# Patient Record
Sex: FEMALE | Race: WHITE | NOT HISPANIC OR LATINO | Employment: OTHER | URBAN - METROPOLITAN AREA
[De-identification: names, ages, dates, MRNs, and addresses within clinical notes are randomized per-mention and may not be internally consistent; named-entity substitution may affect disease eponyms.]

---

## 2020-06-22 ENCOUNTER — HISTORICAL (OUTPATIENT)
Dept: CARDIOLOGY | Facility: HOSPITAL | Age: 65
End: 2020-06-22

## 2020-06-22 LAB
ABS NEUT (OLG): 3.59 X10(3)/MCL (ref 2.1–9.2)
BASOPHILS # BLD AUTO: 0.1 X10(3)/MCL (ref 0–0.2)
BASOPHILS NFR BLD AUTO: 1 %
EOSINOPHIL # BLD AUTO: 0.2 X10(3)/MCL (ref 0–0.9)
EOSINOPHIL NFR BLD AUTO: 3 %
ERYTHROCYTE [DISTWIDTH] IN BLOOD BY AUTOMATED COUNT: 15.7 % (ref 11.5–17)
HCT VFR BLD AUTO: 28.6 % (ref 37–47)
HGB BLD-MCNC: 8.6 GM/DL (ref 12–16)
LYMPHOCYTES # BLD AUTO: 2.8 X10(3)/MCL (ref 0.6–4.6)
LYMPHOCYTES NFR BLD AUTO: 37 %
MCH RBC QN AUTO: 26.9 PG (ref 27–31)
MCHC RBC AUTO-ENTMCNC: 30.1 GM/DL (ref 33–36)
MCV RBC AUTO: 89.4 FL (ref 80–94)
MONOCYTES # BLD AUTO: 0.8 X10(3)/MCL (ref 0.1–1.3)
MONOCYTES NFR BLD AUTO: 11 %
NEUTROPHILS # BLD AUTO: 3.59 X10(3)/MCL (ref 2.1–9.2)
NEUTROPHILS NFR BLD AUTO: 48 %
PLATELET # BLD AUTO: 242 X10(3)/MCL (ref 130–400)
PMV BLD AUTO: 11.4 FL (ref 9.4–12.4)
RBC # BLD AUTO: 3.2 X10(6)/MCL (ref 4.2–5.4)
WBC # SPEC AUTO: 7.5 X10(3)/MCL (ref 4.5–11.5)

## 2021-11-11 ENCOUNTER — HISTORICAL (OUTPATIENT)
Dept: CARDIOLOGY | Facility: HOSPITAL | Age: 66
End: 2021-11-11

## 2021-11-11 LAB
ABS NEUT (OLG): 3.9 X10(3)/MCL (ref 2.1–9.2)
BASOPHILS # BLD AUTO: 0 X10(3)/MCL (ref 0–0.2)
BASOPHILS NFR BLD AUTO: 0 %
BUN SERPL-MCNC: 20 MG/DL (ref 9.8–20.1)
CALCIUM SERPL-MCNC: 9.1 MG/DL (ref 8.7–10.5)
CHLORIDE SERPL-SCNC: 98 MMOL/L (ref 98–107)
CO2 SERPL-SCNC: 23 MMOL/L (ref 23–31)
CREAT SERPL-MCNC: 1.08 MG/DL (ref 0.55–1.02)
CREAT/UREA NIT SERPL: 19
ERYTHROCYTE [DISTWIDTH] IN BLOOD BY AUTOMATED COUNT: 16.3 % (ref 11.5–17)
GLUCOSE SERPL-MCNC: 558 MG/DL (ref 82–115)
HCT VFR BLD AUTO: 29.1 % (ref 37–47)
HGB BLD-MCNC: 8.8 GM/DL (ref 12–16)
LYMPHOCYTES # BLD AUTO: 0.5 X10(3)/MCL (ref 0.6–4.6)
LYMPHOCYTES NFR BLD AUTO: 12 %
MCH RBC QN AUTO: 21.9 PG (ref 27–31)
MCHC RBC AUTO-ENTMCNC: 30.2 GM/DL (ref 33–36)
MCV RBC AUTO: 72.6 FL (ref 80–94)
MONOCYTES # BLD AUTO: 0.1 X10(3)/MCL (ref 0.1–1.3)
MONOCYTES NFR BLD AUTO: 2 %
NEUTROPHILS # BLD AUTO: 3.9 X10(3)/MCL (ref 2.1–9.2)
NEUTROPHILS NFR BLD AUTO: 85 %
PLATELET # BLD AUTO: 244 X10(3)/MCL (ref 130–400)
PMV BLD AUTO: 11.2 FL (ref 9.4–12.4)
POTASSIUM SERPL-SCNC: 4.5 MMOL/L (ref 3.5–5.1)
RBC # BLD AUTO: 4.01 X10(6)/MCL (ref 4.2–5.4)
SODIUM SERPL-SCNC: 135 MMOL/L (ref 136–145)
WBC # SPEC AUTO: 4.6 X10(3)/MCL (ref 4.5–11.5)

## 2022-04-10 ENCOUNTER — HISTORICAL (OUTPATIENT)
Dept: ADMINISTRATIVE | Facility: HOSPITAL | Age: 67
End: 2022-04-10
Payer: MEDICARE

## 2022-04-28 VITALS
WEIGHT: 163.81 LBS | HEIGHT: 61 IN | DIASTOLIC BLOOD PRESSURE: 64 MMHG | SYSTOLIC BLOOD PRESSURE: 142 MMHG | BODY MASS INDEX: 30.93 KG/M2 | OXYGEN SATURATION: 98 %

## 2022-06-30 DIAGNOSIS — M54.50 LOWER BACK PAIN: Primary | ICD-10-CM

## 2022-07-05 ENCOUNTER — DOCUMENTATION ONLY (OUTPATIENT)
Dept: HEMATOLOGY/ONCOLOGY | Facility: CLINIC | Age: 67
End: 2022-07-05
Payer: MEDICARE

## 2022-07-05 NOTE — PROGRESS NOTES
Patient cancelled appt for 7/6/22 due to a fall she had on the Friday before which injured her shoulder. She is unable to drive at this time and states that she will call back when she is better.

## 2022-07-18 ENCOUNTER — TELEPHONE (OUTPATIENT)
Dept: HEMATOLOGY/ONCOLOGY | Facility: CLINIC | Age: 67
End: 2022-07-18

## 2022-07-18 NOTE — TELEPHONE ENCOUNTER
Patient called to let you know she does not have the energy to get up out of bed. She has been getting up only to use bathroom and go back to bed. She slept all weekend that according to her is very unusual. She has had to get blood transfusions in past. She is due for appointment now but don't see anything scheduled. Labs scheduled for 6/27/2022 but not done yet.

## 2022-07-22 ENCOUNTER — OFFICE VISIT (OUTPATIENT)
Dept: HEMATOLOGY/ONCOLOGY | Facility: CLINIC | Age: 67
End: 2022-07-22
Payer: COMMERCIAL

## 2022-07-22 VITALS
BODY MASS INDEX: 28.51 KG/M2 | HEIGHT: 61 IN | DIASTOLIC BLOOD PRESSURE: 71 MMHG | RESPIRATION RATE: 18 BRPM | HEART RATE: 60 BPM | WEIGHT: 151 LBS | OXYGEN SATURATION: 98 % | SYSTOLIC BLOOD PRESSURE: 110 MMHG

## 2022-07-22 DIAGNOSIS — D50.0 IRON DEFICIENCY ANEMIA DUE TO CHRONIC BLOOD LOSS: ICD-10-CM

## 2022-07-22 PROBLEM — F32.A DEPRESSION: Status: ACTIVE | Noted: 2022-07-22

## 2022-07-22 PROBLEM — M79.7 FIBROMYALGIA: Status: ACTIVE | Noted: 2022-07-22

## 2022-07-22 PROCEDURE — 3074F PR MOST RECENT SYSTOLIC BLOOD PRESSURE < 130 MM HG: ICD-10-PCS | Mod: CPTII,,, | Performed by: STUDENT IN AN ORGANIZED HEALTH CARE EDUCATION/TRAINING PROGRAM

## 2022-07-22 PROCEDURE — 3288F PR FALLS RISK ASSESSMENT DOCUMENTED: ICD-10-PCS | Mod: CPTII,,, | Performed by: STUDENT IN AN ORGANIZED HEALTH CARE EDUCATION/TRAINING PROGRAM

## 2022-07-22 PROCEDURE — 1101F PT FALLS ASSESS-DOCD LE1/YR: CPT | Mod: CPTII,,, | Performed by: STUDENT IN AN ORGANIZED HEALTH CARE EDUCATION/TRAINING PROGRAM

## 2022-07-22 PROCEDURE — 3008F BODY MASS INDEX DOCD: CPT | Mod: CPTII,,, | Performed by: STUDENT IN AN ORGANIZED HEALTH CARE EDUCATION/TRAINING PROGRAM

## 2022-07-22 PROCEDURE — 3078F DIAST BP <80 MM HG: CPT | Mod: CPTII,,, | Performed by: STUDENT IN AN ORGANIZED HEALTH CARE EDUCATION/TRAINING PROGRAM

## 2022-07-22 PROCEDURE — 1101F PR PT FALLS ASSESS DOC 0-1 FALLS W/OUT INJ PAST YR: ICD-10-PCS | Mod: CPTII,,, | Performed by: STUDENT IN AN ORGANIZED HEALTH CARE EDUCATION/TRAINING PROGRAM

## 2022-07-22 PROCEDURE — 1159F PR MEDICATION LIST DOCUMENTED IN MEDICAL RECORD: ICD-10-PCS | Mod: CPTII,,, | Performed by: STUDENT IN AN ORGANIZED HEALTH CARE EDUCATION/TRAINING PROGRAM

## 2022-07-22 PROCEDURE — 3074F SYST BP LT 130 MM HG: CPT | Mod: CPTII,,, | Performed by: STUDENT IN AN ORGANIZED HEALTH CARE EDUCATION/TRAINING PROGRAM

## 2022-07-22 PROCEDURE — 99215 PR OFFICE/OUTPT VISIT, EST, LEVL V, 40-54 MIN: ICD-10-PCS | Mod: ,,, | Performed by: STUDENT IN AN ORGANIZED HEALTH CARE EDUCATION/TRAINING PROGRAM

## 2022-07-22 PROCEDURE — 3288F FALL RISK ASSESSMENT DOCD: CPT | Mod: CPTII,,, | Performed by: STUDENT IN AN ORGANIZED HEALTH CARE EDUCATION/TRAINING PROGRAM

## 2022-07-22 PROCEDURE — 1159F MED LIST DOCD IN RCRD: CPT | Mod: CPTII,,, | Performed by: STUDENT IN AN ORGANIZED HEALTH CARE EDUCATION/TRAINING PROGRAM

## 2022-07-22 PROCEDURE — 3008F PR BODY MASS INDEX (BMI) DOCUMENTED: ICD-10-PCS | Mod: CPTII,,, | Performed by: STUDENT IN AN ORGANIZED HEALTH CARE EDUCATION/TRAINING PROGRAM

## 2022-07-22 PROCEDURE — 3078F PR MOST RECENT DIASTOLIC BLOOD PRESSURE < 80 MM HG: ICD-10-PCS | Mod: CPTII,,, | Performed by: STUDENT IN AN ORGANIZED HEALTH CARE EDUCATION/TRAINING PROGRAM

## 2022-07-22 PROCEDURE — 99215 OFFICE O/P EST HI 40 MIN: CPT | Mod: ,,, | Performed by: STUDENT IN AN ORGANIZED HEALTH CARE EDUCATION/TRAINING PROGRAM

## 2022-07-22 RX ORDER — METFORMIN HYDROCHLORIDE 1000 MG/1
1000 TABLET ORAL 2 TIMES DAILY
COMMUNITY

## 2022-07-22 RX ORDER — ROPINIROLE 3 MG/1
3 TABLET, FILM COATED ORAL NIGHTLY
COMMUNITY
Start: 2021-11-11

## 2022-07-22 RX ORDER — TRAMADOL HYDROCHLORIDE 200 MG/1
200 TABLET, EXTENDED RELEASE ORAL DAILY
COMMUNITY

## 2022-07-22 RX ORDER — VENLAFAXINE 75 MG/1
75 TABLET ORAL 2 TIMES DAILY
COMMUNITY

## 2022-07-22 RX ORDER — PREGABALIN 100 MG/1
100 CAPSULE ORAL 3 TIMES DAILY PRN
COMMUNITY

## 2022-07-22 NOTE — PROGRESS NOTES
Chief Complaint  6 wk f/u, Labs, Anemia  History of Present Illness  Referring provider: Dr. Bond  Reason for consult: Anemia      Patient is a 66-year-old with a diagnosis of fibromyalgia, type 2 diabetes, osteoarthritis, and peripheral vascular disease who presented to clinic to establish care for anemia.  Patient reports being diagnosed with anemia about 8 months back while following with a cardiologist for vascular insufficiency.  She was found to have a hemoglobin level of 6 and required 2 units of blood transfusion back pain.  Patient denies receiving any further blood transfusions.  She denies any symptoms of GI blood loss recently.  Patient has had a colonoscopy and EGD in September/November 2021 which was reportedly within normal limits.  Patient reports a prior history of hysterectomy as well as hemorrhoid surgery.  She denies any history of gastric surgeries.  She reports a well-balanced diet.  Patient denies any symptoms of chest pain, palpitation or shortness of breath.  She does report persistent fatigue which is unchanged in nature.     Today, 07/22/2022, patient reports symptoms of profound fatigue leading to decline in her functional status.  She denies chest pain, shortness of breath, palpitations.  She denies any clinical symptoms of bleeding.  Patient reports an episode of passing out when she was at laboratory to do her blood work prior to this visit.  She was seen in the ER his CT head done which was within normal limits and was discharged with plans for outpatient follow-up.      Past medical history: Hypertension, hyperlipidemia, type 2 diabetes, fibromyalgia, osteoarthritis, peripheral vascular disease  Past surgical history: Hysterectomy, hemorrhoid surgery  Social history: Current smoker, half pack per day, 20-pack-year smoking history, denies alcohol use or recreational drug use.  Used to work as a manager at [employer], currently retired, lives with her .  Family history: Patient  was adopted      Laboratory  10/19/2021: WBC 5.9, hemoglobin 9.3, MCV 74.7, platelet count 354, sodium 134, potassium 4.4, calcium 8.6, creatinine 0.9.  2/23/2022: WBC 5.90, hemoglobin 10.6, MCV 71.3, platelet count 189.  Iron 13, TIBC 638, percent saturation 2, ferritin 3, folic acid 10.48, vitamin B12 348  4/6/2022: WBC count 6.46, hemoglobin 11.7, MCV 82.8, platelet count 237, iron 71, TIBC 395, percent saturation 18, ferritin 295  07/19/2022:  WBC 7.7, hemoglobin 13.3, MCV 95.4, platelet count 297, iron 68, TIBC 424,% saturation 16, ferritin 8 2, folic acid 9.7, vitamin B12 347.        Treatment history  3/7/22-3/14/22: Injectafer x2    Review of Systems  CONSTITUTIONAL: no fevers, no chills, no weight loss, + fatigue, no weakness  HEMATOLOGIC: no abnromal bleeding, no abnormal bruising, no drenching  night sweats  ONCOLOGIC: no new masses or lumps  HEENT: no vision loss, no tinnitus or hearing loss, no nose bleeding, no dysphagia, no odynophagia  CVS: no chest pain, no palpitations, no dyspnea on exertion  RESP: no shortness of breath, no hemoptysis, no cough  BREAST: no nipple discharge, no breast tenderness, no breast masses on self breast examination  GI: no nausea, no vomiting, no diarrhea, no constipation, no melena, no hematochezia, no hematemesis, no abdominal pain, no increase in abdominal girth  : no dysuria, no hematuria, no discharge  GYN: no abnormal vaginal bleeding, no dyspareunia, no vaginal discharge  INTEGUMENT: no rashes, no abnormal bruising, no nail pitting, no hyperpigmentation  NEURO: no falls, no memory loss, no paresthesias or dysesthesias, no urofecal incontinence or retention, no loss of strength on any extremity  MSK: no back pain, no new joint pain, no joint swelling  PSYCH: no suicidal or homicidal ideation, no depression, no insomnia, no anhedonia  ENDOCRINE: no heat or cold intolerance, no polyuria, no polydipsia           Physical Exam    Vitals:    07/22/22 1020   BP: 110/71    Pulse: 60   Resp: 18       GA: AAOx3, NAD, appears tired  HEENT: Mild pallor,, PERRLA, EOMI, good dentition, no oral ulcers  LYMPH: no cervical, axillary or supraclavicular adenopathy  CVS: s1s2 RRR, no M/R/G  RESP: CTA b/l, no crackles, no wheezes or ronchi  ABD: soft, NT, ND, BS+, no hepatosplenomegaly  EXT: no deformities, no pedal edema  SKIN: no rashes, no bruises or purpura, warm and dry  NEURO: normal mentation, strength 5/5 on all 4 extremities, no sensory deficits      Assessment/Plan  1. Iron deficiency anemia D50.9  Patient with a diagnosis of chronic hypochromic microcytic anemia since April 2021  Patient reports receiving 2 units of blood transfusion in April.  Patient has had an EGD and colonoscopy in fall 2021 which was reportedly within normal limits  Patient denies undergoing capsule endoscopy.  Patient is s/p hysterectomy and hemorrhoidectomy.  She denies any symptoms of GI blood loss, nausea, vomiting, epistaxis, hemoptysis.  Patient denies any prior history of gastric surgeries and reports a well-balanced diet.    Noted that are consistent with iron deficiency anemia with extremely low iron and ferritin levels.  Patient s/p Injectafer x2 with improvement in the hemoglobin as well as iron indicis  Discussed the likely cause of iron deficiency as GI blood loss.  Patient has had colonoscopy and EGD last year however without capsule endoscopy.   Noted positive stool occult blood  Patient is scheduled to see Dr. Graff next month.  Will try and get her a earlier appointment for considerations of EGD/colonoscopy/capsule endoscopy in the setting of iron deficiency anemia and GI blood loss  Referred patient to Neurology given a syncopal episode leading to ER visit  Patient was advised to call our office if she were to develop symptoms of chest pain, shortness of breath, palpitations, progressive fatigue or symptoms of GI blood loss.      Plan  # Refer to neurology given recent syncopal episode  # Refer  to gastroenterology  # patient was encouraged to keep her follow-up with psychiatry to rule out uncontrolled clinical depression.  # follow-up in clinic in 3 months with repeat iron panel, CBC, CMP, vitamin B12, folic acid level    A total of  40 minutes were spent in review of records, interpretation of test, coordination of care, discussion and counseling with the patient.

## 2022-09-26 ENCOUNTER — OFFICE VISIT (OUTPATIENT)
Dept: ORTHOPEDIC SURGERY | Facility: CLINIC | Age: 67
End: 2022-09-26
Payer: MEDICARE

## 2022-09-26 ENCOUNTER — HOSPITAL ENCOUNTER (OUTPATIENT)
Dept: RADIOLOGY | Facility: CLINIC | Age: 67
Discharge: HOME | End: 2022-09-26
Attending: ORTHOPAEDIC SURGERY
Payer: COMMERCIAL

## 2022-09-26 VITALS — BODY MASS INDEX: 28.51 KG/M2 | HEIGHT: 61 IN | WEIGHT: 151 LBS

## 2022-09-26 DIAGNOSIS — M51.36 DDD (DEGENERATIVE DISC DISEASE), LUMBAR: ICD-10-CM

## 2022-09-26 DIAGNOSIS — M54.9 DORSALGIA, UNSPECIFIED: ICD-10-CM

## 2022-09-26 DIAGNOSIS — M43.10 SPONDYLOLISTHESIS, ACQUIRED: Primary | ICD-10-CM

## 2022-09-26 PROCEDURE — 3008F BODY MASS INDEX DOCD: CPT | Mod: CPTII,S$GLB,, | Performed by: ORTHOPAEDIC SURGERY

## 2022-09-26 PROCEDURE — 1159F MED LIST DOCD IN RCRD: CPT | Mod: CPTII,S$GLB,, | Performed by: ORTHOPAEDIC SURGERY

## 2022-09-26 PROCEDURE — 99204 PR OFFICE/OUTPT VISIT, NEW, LEVL IV, 45-59 MIN: ICD-10-PCS | Mod: S$GLB,,, | Performed by: ORTHOPAEDIC SURGERY

## 2022-09-26 PROCEDURE — 3008F PR BODY MASS INDEX (BMI) DOCUMENTED: ICD-10-PCS | Mod: CPTII,S$GLB,, | Performed by: ORTHOPAEDIC SURGERY

## 2022-09-26 PROCEDURE — 1159F PR MEDICATION LIST DOCUMENTED IN MEDICAL RECORD: ICD-10-PCS | Mod: CPTII,S$GLB,, | Performed by: ORTHOPAEDIC SURGERY

## 2022-09-26 PROCEDURE — 99204 OFFICE O/P NEW MOD 45 MIN: CPT | Mod: S$GLB,,, | Performed by: ORTHOPAEDIC SURGERY

## 2022-09-26 PROCEDURE — 72110 X-RAY EXAM L-2 SPINE 4/>VWS: CPT | Mod: ,,, | Performed by: ORTHOPAEDIC SURGERY

## 2022-09-26 PROCEDURE — 72110 XR LUMBAR SPINE AP AND LAT WITH FLEX/EXT: ICD-10-PCS | Mod: ,,, | Performed by: ORTHOPAEDIC SURGERY

## 2022-09-26 NOTE — PROGRESS NOTES
DATE: 2022  PATIENT: Radha Day    Attending Physician: Avtar Sandoval M.D.    CHIEF COMPLAINT:  Low back pain    HISTORY:  Radha Day is a 67 y.o. female who presents for evaluation of neck and low back pain.  Her chief complaint is low back pain this began progressively worse over the years.  She does have a history of fibromyalgia for which she takes tramadol and Lyrica.  The patient's chief complaint is low back pain causing difficulty sleeping, this in fact sleeping in recliner over the past year.  Her pain is also worse after a long day.  She has difficulty rising from the seated position.  She has not yet been to physical therapy.      Review of systems is notable 10 lb of unintentional weight loss, as well as night sweats and fatigue.  The patient reports that she is up-to-date on her routine health screenings.    The Patient denies myelopathic symptoms such as handwriting changes or difficulty with buttons/coins/keys. Denies perineal paresthesias, bowel/bladder dysfunction.    PAST MEDICAL/SURGICAL HISTORY:  Past Medical History:   Diagnosis Date    Depression     Diabetes mellitus     Fibromyalgia     Hyperlipidemia     Hypertension      Past Surgical History:   Procedure Laterality Date    ADENOIDECTOMY      BILATERAL TUBAL LIGATION      CARPAL TUNNEL RELEASE      x2     SECTION      DILATION AND CURETTAGE OF UTERUS      x3    HYSTERECTOMY      LIGATION OF HEMORRHOIDS      TONSILLECTOMY         Current Medications:   Current Outpatient Medications:     metFORMIN (GLUCOPHAGE) 1000 MG tablet, Take 1,000 mg by mouth 2 (two) times a day., Disp: , Rfl:     pregabalin (LYRICA) 100 MG capsule, Take 100 mg by mouth 3 (three) times daily as needed., Disp: , Rfl:     rOPINIRole (REQUIP) 3 MG tablet, Take 3 mg by mouth nightly., Disp: , Rfl:     rosuvastatin 20 mg CpSP, Take 20 mg by mouth nightly., Disp: , Rfl:     traMADoL (ULTRAM-ER) 200 MG Tb24, Take 200 mg by mouth once daily at  "6am., Disp: , Rfl:     venlafaxine (EFFEXOR) 75 MG tablet, Take 75 mg by mouth 2 (two) times a day., Disp: , Rfl:     Social History:   Social History     Socioeconomic History    Marital status:    Tobacco Use    Smoking status: Some Days     Types: Vaping with nicotine    Smokeless tobacco: Never    Tobacco comments:     was a cigarette smoker for 42 years   Substance and Sexual Activity    Alcohol use: Not Currently        EXAM:  Ht 5' 1" (1.549 m)   Wt 68.5 kg (151 lb 0.2 oz)   BMI 28.53 kg/m²     PHYSICAL EXAMINATION:    Gait: Normal station and gait, no difficulty with toe or heel walk.   Skin: Dorsal lumbar skin negative for rashes, lesions, hairy patches and surgical scars. There is minimal lumbar tenderness to palpation.  Range of motion: Lumbar range of motion is acceptable.  Spinal Balance: Global saggital and coronal spinal balance acceptable, no significant for scoliosis and kyphosis.  Musculoskeletal: No pain with the range of motion of the bilateral hips. No trochanteric tenderness to palpation.  Vascular: Bilateral lower extremities warm and well perfused, Dorsalis pedis pulses 2+ bilaterally.  Neurological: Normal strength and tone in all major motor groups in the bilateral lower extremities. Normal sensation to light touch in the L2-S1 dermatomes bilaterally.  Deep tendon reflexes symmetric 1+ in the bilateral lower extremities.  Negative Babinski bilaterally. Straight leg raise negative bilaterally.    IMAGING:      Today I personally reviewed AP, Lat and Flex/Ex  upright L-spine radiographs taken in the office today that demonstrate a grade 1 L4-5 anterolisthesis.  Body mass index is 28.53 kg/m².  Hemoglobin A1C   Date Value Ref Range Status   11/29/2020 9.4 (H) <=6.5 % Final       ASSESSMENT/PLAN:    Diagnoses and all orders for this visit:    Spondylolisthesis, acquired    Dorsalgia, unspecified  -     MRI Lumbar Spine Without Contrast; Future      No follow-ups on file.    Given the " patient's constitutional symptoms I recommended a MRI of the lumbar spine to evaluate her low back pain in the context of possible metastatic disease I will see her back for results.

## 2023-06-28 ENCOUNTER — TELEPHONE (OUTPATIENT)
Dept: HEMATOLOGY/ONCOLOGY | Facility: CLINIC | Age: 68
End: 2023-06-28
Payer: MEDICARE

## 2023-06-28 NOTE — TELEPHONE ENCOUNTER
Patient with Dx of iron deficiency called and complains of being very tired, no energy and very cold in late afternoon, sleeping a lot more than normal and worried her iron may be low, states this has been going on for 2 weeks now. Last seen in July 2022. Please advise.    Thank you

## 2023-06-30 RX ORDER — HEPARIN 100 UNIT/ML
5 SYRINGE INTRAVENOUS
Status: CANCELLED | OUTPATIENT
Start: 2023-07-03

## 2023-06-30 RX ORDER — DIPHENHYDRAMINE HYDROCHLORIDE 50 MG/ML
50 INJECTION INTRAMUSCULAR; INTRAVENOUS ONCE AS NEEDED
Status: CANCELLED | OUTPATIENT
Start: 2023-07-03

## 2023-06-30 RX ORDER — SODIUM CHLORIDE 0.9 % (FLUSH) 0.9 %
10 SYRINGE (ML) INJECTION
Status: CANCELLED | OUTPATIENT
Start: 2023-07-03

## 2023-06-30 RX ORDER — SODIUM CHLORIDE 9 MG/ML
INJECTION, SOLUTION INTRAVENOUS CONTINUOUS
Status: CANCELLED | OUTPATIENT
Start: 2023-07-03

## 2023-06-30 RX ORDER — EPINEPHRINE 0.3 MG/.3ML
0.3 INJECTION SUBCUTANEOUS ONCE AS NEEDED
Status: CANCELLED | OUTPATIENT
Start: 2023-07-03

## 2023-07-05 RX ORDER — EPINEPHRINE 0.3 MG/.3ML
0.3 INJECTION SUBCUTANEOUS ONCE AS NEEDED
Status: CANCELLED | OUTPATIENT
Start: 2023-07-05

## 2023-07-05 RX ORDER — HEPARIN 100 UNIT/ML
500 SYRINGE INTRAVENOUS
OUTPATIENT
Start: 2023-07-05

## 2023-07-05 RX ORDER — HEPARIN 100 UNIT/ML
500 SYRINGE INTRAVENOUS
Status: CANCELLED | OUTPATIENT
Start: 2023-07-05

## 2023-07-05 RX ORDER — EPINEPHRINE 0.3 MG/.3ML
0.3 INJECTION SUBCUTANEOUS ONCE AS NEEDED
OUTPATIENT
Start: 2023-07-05

## 2023-07-05 RX ORDER — DIPHENHYDRAMINE HYDROCHLORIDE 50 MG/ML
50 INJECTION INTRAMUSCULAR; INTRAVENOUS ONCE AS NEEDED
Status: CANCELLED | OUTPATIENT
Start: 2023-07-05

## 2023-07-05 RX ORDER — SODIUM CHLORIDE 0.9 % (FLUSH) 0.9 %
10 SYRINGE (ML) INJECTION
OUTPATIENT
Start: 2023-07-05

## 2023-07-05 RX ORDER — SODIUM CHLORIDE 0.9 % (FLUSH) 0.9 %
10 SYRINGE (ML) INJECTION
Status: CANCELLED | OUTPATIENT
Start: 2023-07-05

## 2023-07-05 RX ORDER — DIPHENHYDRAMINE HYDROCHLORIDE 50 MG/ML
50 INJECTION INTRAMUSCULAR; INTRAVENOUS ONCE AS NEEDED
OUTPATIENT
Start: 2023-07-05

## 2023-07-05 NOTE — TELEPHONE ENCOUNTER
Labs came in on Friday, I wanted to follow up with you, I did not see note in chart and want to make sure this was followed up while I was out.

## 2025-04-03 ENCOUNTER — HOSPITAL ENCOUNTER (INPATIENT)
Facility: HOSPITAL | Age: 70
LOS: 2 days | Discharge: HOME | DRG: 378 | End: 2025-04-05
Attending: INTERNAL MEDICINE | Admitting: INTERNAL MEDICINE
Payer: MEDICARE

## 2025-04-03 DIAGNOSIS — R53.1 WEAKNESS: ICD-10-CM

## 2025-04-03 DIAGNOSIS — K92.2 GASTROINTESTINAL HEMORRHAGE, UNSPECIFIED GASTROINTESTINAL HEMORRHAGE TYPE: Primary | ICD-10-CM

## 2025-04-03 DIAGNOSIS — D64.9 ANEMIA, UNSPECIFIED TYPE: ICD-10-CM

## 2025-04-03 LAB
ABO + RH BLD: NORMAL
ABO + RH BLD: NORMAL
ABORH RETYPE: NORMAL
ALBUMIN SERPL-MCNC: 3.3 G/DL (ref 3.4–4.8)
ALBUMIN/GLOB SERPL: 0.9 RATIO (ref 1.1–2)
ALP SERPL-CCNC: 85 UNIT/L (ref 40–150)
ALT SERPL-CCNC: 11 UNIT/L (ref 0–55)
ANION GAP SERPL CALC-SCNC: 11 MEQ/L
APTT PPP: 26.2 SECONDS (ref 23.2–33.7)
AST SERPL-CCNC: 15 UNIT/L (ref 11–45)
BACTERIA #/AREA URNS AUTO: ABNORMAL /HPF
BASOPHILS # BLD AUTO: 0.08 X10(3)/MCL
BASOPHILS NFR BLD AUTO: 1.2 %
BILIRUB SERPL-MCNC: 0.2 MG/DL
BILIRUB UR QL STRIP.AUTO: NEGATIVE
BLD PROD TYP BPU: NORMAL
BLD PROD TYP BPU: NORMAL
BLOOD UNIT EXPIRATION DATE: NORMAL
BLOOD UNIT EXPIRATION DATE: NORMAL
BLOOD UNIT TYPE CODE: 8400
BLOOD UNIT TYPE CODE: 8400
BUN SERPL-MCNC: 14.4 MG/DL (ref 9.8–20.1)
CALCIUM SERPL-MCNC: 9 MG/DL (ref 8.4–10.2)
CHLORIDE SERPL-SCNC: 103 MMOL/L (ref 98–107)
CLARITY UR: CLEAR
CO2 SERPL-SCNC: 20 MMOL/L (ref 23–31)
COLOR UR AUTO: COLORLESS
CREAT SERPL-MCNC: 1.03 MG/DL (ref 0.55–1.02)
CREAT/UREA NIT SERPL: 14
CROSSMATCH INTERPRETATION: NORMAL
CROSSMATCH INTERPRETATION: NORMAL
DISPENSE STATUS: NORMAL
DISPENSE STATUS: NORMAL
EOSINOPHIL # BLD AUTO: 0.08 X10(3)/MCL (ref 0–0.9)
EOSINOPHIL NFR BLD AUTO: 1.2 %
ERYTHROCYTE [DISTWIDTH] IN BLOOD BY AUTOMATED COUNT: 19.5 % (ref 11.5–17)
FERRITIN SERPL-MCNC: 13.06 NG/ML (ref 4.63–204)
GFR SERPLBLD CREATININE-BSD FMLA CKD-EPI: 59 ML/MIN/1.73/M2
GLOBULIN SER-MCNC: 3.5 GM/DL (ref 2.4–3.5)
GLUCOSE SERPL-MCNC: 258 MG/DL (ref 70–110)
GLUCOSE SERPL-MCNC: 336 MG/DL (ref 82–115)
GLUCOSE SERPL-MCNC: 378 MG/DL (ref 70–110)
GLUCOSE UR QL STRIP: ABNORMAL
GROUP & RH: NORMAL
HCT VFR BLD AUTO: 21.4 % (ref 37–47)
HCT VFR BLD AUTO: 28.1 % (ref 37–47)
HGB BLD-MCNC: 6.2 G/DL (ref 12–16)
HGB BLD-MCNC: 8.8 G/DL (ref 12–16)
HGB UR QL STRIP: NEGATIVE
IMM GRANULOCYTES # BLD AUTO: 0.03 X10(3)/MCL (ref 0–0.04)
IMM GRANULOCYTES NFR BLD AUTO: 0.4 %
INDIRECT COOMBS: NORMAL
INR PPP: 1.1
IRON SATN MFR SERPL: 6 % (ref 20–50)
IRON SERPL-MCNC: 25 UG/DL (ref 50–170)
KETONES UR QL STRIP: NEGATIVE
LEUKOCYTE ESTERASE UR QL STRIP: NEGATIVE
LYMPHOCYTES # BLD AUTO: 1.11 X10(3)/MCL (ref 0.6–4.6)
LYMPHOCYTES NFR BLD AUTO: 16.3 %
MAGNESIUM SERPL-MCNC: 1.7 MG/DL (ref 1.6–2.6)
MCH RBC QN AUTO: 21.8 PG (ref 27–31)
MCHC RBC AUTO-ENTMCNC: 29 G/DL (ref 33–36)
MCV RBC AUTO: 75.1 FL (ref 80–94)
MONOCYTES # BLD AUTO: 0.43 X10(3)/MCL (ref 0.1–1.3)
MONOCYTES NFR BLD AUTO: 6.3 %
MUCOUS THREADS URNS QL MICRO: ABNORMAL /LPF
NEUTROPHILS # BLD AUTO: 5.1 X10(3)/MCL (ref 2.1–9.2)
NEUTROPHILS NFR BLD AUTO: 74.6 %
NITRITE UR QL STRIP: NEGATIVE
NRBC BLD AUTO-RTO: 0.3 %
PH UR STRIP: 6 [PH]
PLATELET # BLD AUTO: 232 X10(3)/MCL (ref 130–400)
PMV BLD AUTO: 10.9 FL (ref 7.4–10.4)
POCT GLUCOSE: 378 MG/DL (ref 70–110)
POTASSIUM SERPL-SCNC: 4.8 MMOL/L (ref 3.5–5.1)
PROT SERPL-MCNC: 6.8 GM/DL (ref 5.8–7.6)
PROT UR QL STRIP: NEGATIVE
PROTHROMBIN TIME: 13.5 SECONDS (ref 12.5–14.5)
RBC # BLD AUTO: 2.85 X10(6)/MCL (ref 4.2–5.4)
RBC #/AREA URNS AUTO: ABNORMAL /HPF
SODIUM SERPL-SCNC: 134 MMOL/L (ref 136–145)
SP GR UR STRIP.AUTO: 1.01 (ref 1–1.03)
SPECIMEN OUTDATE: NORMAL
SQUAMOUS #/AREA URNS LPF: ABNORMAL /HPF
TIBC SERPL-MCNC: 428 UG/DL (ref 70–310)
TIBC SERPL-MCNC: 453 UG/DL (ref 250–450)
TRANSFERRIN SERPL-MCNC: 428 MG/DL (ref 173–360)
TROPONIN I SERPL-MCNC: 0.04 NG/ML (ref 0–0.04)
UNIT NUMBER: NORMAL
UNIT NUMBER: NORMAL
UROBILINOGEN UR STRIP-ACNC: NORMAL
WBC # BLD AUTO: 6.83 X10(3)/MCL (ref 4.5–11.5)
WBC #/AREA URNS AUTO: ABNORMAL /HPF

## 2025-04-03 PROCEDURE — 36415 COLL VENOUS BLD VENIPUNCTURE: CPT | Performed by: INTERNAL MEDICINE

## 2025-04-03 PROCEDURE — P9016 RBC LEUKOCYTES REDUCED: HCPCS | Performed by: PHYSICIAN ASSISTANT

## 2025-04-03 PROCEDURE — 99285 EMERGENCY DEPT VISIT HI MDM: CPT | Mod: 25

## 2025-04-03 PROCEDURE — 96361 HYDRATE IV INFUSION ADD-ON: CPT

## 2025-04-03 PROCEDURE — 82728 ASSAY OF FERRITIN: CPT | Performed by: STUDENT IN AN ORGANIZED HEALTH CARE EDUCATION/TRAINING PROGRAM

## 2025-04-03 PROCEDURE — 81001 URINALYSIS AUTO W/SCOPE: CPT | Performed by: PHYSICIAN ASSISTANT

## 2025-04-03 PROCEDURE — 63600175 PHARM REV CODE 636 W HCPCS: Performed by: INTERNAL MEDICINE

## 2025-04-03 PROCEDURE — 25000003 PHARM REV CODE 250: Performed by: INTERNAL MEDICINE

## 2025-04-03 PROCEDURE — 85730 THROMBOPLASTIN TIME PARTIAL: CPT | Performed by: PHYSICIAN ASSISTANT

## 2025-04-03 PROCEDURE — 30233N1 TRANSFUSION OF NONAUTOLOGOUS RED BLOOD CELLS INTO PERIPHERAL VEIN, PERCUTANEOUS APPROACH: ICD-10-PCS | Performed by: STUDENT IN AN ORGANIZED HEALTH CARE EDUCATION/TRAINING PROGRAM

## 2025-04-03 PROCEDURE — 96374 THER/PROPH/DIAG INJ IV PUSH: CPT | Mod: 59

## 2025-04-03 PROCEDURE — 36415 COLL VENOUS BLD VENIPUNCTURE: CPT | Performed by: PHYSICIAN ASSISTANT

## 2025-04-03 PROCEDURE — 83550 IRON BINDING TEST: CPT | Performed by: STUDENT IN AN ORGANIZED HEALTH CARE EDUCATION/TRAINING PROGRAM

## 2025-04-03 PROCEDURE — 93010 ELECTROCARDIOGRAM REPORT: CPT | Mod: ,,, | Performed by: INTERNAL MEDICINE

## 2025-04-03 PROCEDURE — 80053 COMPREHEN METABOLIC PANEL: CPT | Performed by: PHYSICIAN ASSISTANT

## 2025-04-03 PROCEDURE — 63600175 PHARM REV CODE 636 W HCPCS: Performed by: PHYSICIAN ASSISTANT

## 2025-04-03 PROCEDURE — 84484 ASSAY OF TROPONIN QUANT: CPT | Performed by: PHYSICIAN ASSISTANT

## 2025-04-03 PROCEDURE — 85610 PROTHROMBIN TIME: CPT | Performed by: PHYSICIAN ASSISTANT

## 2025-04-03 PROCEDURE — 85025 COMPLETE CBC W/AUTO DIFF WBC: CPT | Performed by: PHYSICIAN ASSISTANT

## 2025-04-03 PROCEDURE — 86850 RBC ANTIBODY SCREEN: CPT | Performed by: PHYSICIAN ASSISTANT

## 2025-04-03 PROCEDURE — 25000003 PHARM REV CODE 250: Performed by: PHYSICIAN ASSISTANT

## 2025-04-03 PROCEDURE — 86923 COMPATIBILITY TEST ELECTRIC: CPT | Performed by: PHYSICIAN ASSISTANT

## 2025-04-03 PROCEDURE — 36430 TRANSFUSION BLD/BLD COMPNT: CPT

## 2025-04-03 PROCEDURE — 93005 ELECTROCARDIOGRAM TRACING: CPT

## 2025-04-03 PROCEDURE — 25000003 PHARM REV CODE 250: Performed by: NURSE PRACTITIONER

## 2025-04-03 PROCEDURE — 11000001 HC ACUTE MED/SURG PRIVATE ROOM

## 2025-04-03 PROCEDURE — 85018 HEMOGLOBIN: CPT | Performed by: INTERNAL MEDICINE

## 2025-04-03 PROCEDURE — 83735 ASSAY OF MAGNESIUM: CPT | Performed by: PHYSICIAN ASSISTANT

## 2025-04-03 PROCEDURE — 96376 TX/PRO/DX INJ SAME DRUG ADON: CPT

## 2025-04-03 PROCEDURE — 0DB68ZX EXCISION OF STOMACH, VIA NATURAL OR ARTIFICIAL OPENING ENDOSCOPIC, DIAGNOSTIC: ICD-10-PCS | Performed by: STUDENT IN AN ORGANIZED HEALTH CARE EDUCATION/TRAINING PROGRAM

## 2025-04-03 RX ORDER — PANTOPRAZOLE SODIUM 40 MG/10ML
40 INJECTION, POWDER, LYOPHILIZED, FOR SOLUTION INTRAVENOUS
Status: COMPLETED | OUTPATIENT
Start: 2025-04-03 | End: 2025-04-03

## 2025-04-03 RX ORDER — ONDANSETRON HYDROCHLORIDE 2 MG/ML
4 INJECTION, SOLUTION INTRAVENOUS EVERY 8 HOURS PRN
Status: DISCONTINUED | OUTPATIENT
Start: 2025-04-03 | End: 2025-04-05 | Stop reason: HOSPADM

## 2025-04-03 RX ORDER — ROPINIROLE 1 MG/1
2 TABLET, FILM COATED ORAL 3 TIMES DAILY
Status: DISCONTINUED | OUTPATIENT
Start: 2025-04-03 | End: 2025-04-05 | Stop reason: HOSPADM

## 2025-04-03 RX ORDER — IBUPROFEN 200 MG
16 TABLET ORAL
Status: DISCONTINUED | OUTPATIENT
Start: 2025-04-03 | End: 2025-04-05 | Stop reason: HOSPADM

## 2025-04-03 RX ORDER — HYDROCHLOROTHIAZIDE 12.5 MG/1
12.5 TABLET ORAL DAILY
Status: DISCONTINUED | OUTPATIENT
Start: 2025-04-04 | End: 2025-04-03

## 2025-04-03 RX ORDER — LISINOPRIL AND HYDROCHLOROTHIAZIDE 10; 12.5 MG/1; MG/1
1 TABLET ORAL DAILY
COMMUNITY

## 2025-04-03 RX ORDER — HYDROCHLOROTHIAZIDE 12.5 MG/1
12.5 TABLET ORAL DAILY
Status: DISCONTINUED | OUTPATIENT
Start: 2025-04-04 | End: 2025-04-05 | Stop reason: HOSPADM

## 2025-04-03 RX ORDER — LISINOPRIL 10 MG/1
10 TABLET ORAL DAILY
Status: DISCONTINUED | OUTPATIENT
Start: 2025-04-03 | End: 2025-04-05 | Stop reason: HOSPADM

## 2025-04-03 RX ORDER — LISINOPRIL 10 MG/1
10 TABLET ORAL DAILY
Status: DISCONTINUED | OUTPATIENT
Start: 2025-04-04 | End: 2025-04-03

## 2025-04-03 RX ORDER — IBUPROFEN 800 MG/1
800 TABLET ORAL 2 TIMES DAILY
Status: ON HOLD | COMMUNITY
End: 2025-04-05 | Stop reason: HOSPADM

## 2025-04-03 RX ORDER — ROPINIROLE 2 MG/1
2 TABLET, FILM COATED ORAL 3 TIMES DAILY
COMMUNITY

## 2025-04-03 RX ORDER — VENLAFAXINE HYDROCHLORIDE 37.5 MG/1
37.5 CAPSULE, EXTENDED RELEASE ORAL DAILY
Status: ON HOLD | COMMUNITY
End: 2025-04-05 | Stop reason: HOSPADM

## 2025-04-03 RX ORDER — ATORVASTATIN CALCIUM 10 MG/1
10 TABLET, FILM COATED ORAL DAILY
Status: DISCONTINUED | OUTPATIENT
Start: 2025-04-04 | End: 2025-04-05 | Stop reason: HOSPADM

## 2025-04-03 RX ORDER — AMITRIPTYLINE HYDROCHLORIDE 10 MG/1
10 TABLET, FILM COATED ORAL NIGHTLY
Status: DISCONTINUED | OUTPATIENT
Start: 2025-04-03 | End: 2025-04-05 | Stop reason: HOSPADM

## 2025-04-03 RX ORDER — PREGABALIN 100 MG/1
100 CAPSULE ORAL NIGHTLY
Status: DISCONTINUED | OUTPATIENT
Start: 2025-04-03 | End: 2025-04-05 | Stop reason: HOSPADM

## 2025-04-03 RX ORDER — GLUCAGON 1 MG
1 KIT INJECTION
Status: DISCONTINUED | OUTPATIENT
Start: 2025-04-03 | End: 2025-04-05 | Stop reason: HOSPADM

## 2025-04-03 RX ORDER — LISINOPRIL AND HYDROCHLOROTHIAZIDE 10; 12.5 MG/1; MG/1
1 TABLET ORAL DAILY
Status: DISCONTINUED | OUTPATIENT
Start: 2025-04-04 | End: 2025-04-03 | Stop reason: CLARIF

## 2025-04-03 RX ORDER — FAMOTIDINE 40 MG/1
40 TABLET, FILM COATED ORAL DAILY
Status: ON HOLD | COMMUNITY
Start: 2025-03-27 | End: 2025-04-05 | Stop reason: HOSPADM

## 2025-04-03 RX ORDER — ACETAMINOPHEN 325 MG/1
650 TABLET ORAL EVERY 4 HOURS PRN
Status: DISCONTINUED | OUTPATIENT
Start: 2025-04-03 | End: 2025-04-05 | Stop reason: HOSPADM

## 2025-04-03 RX ORDER — CILOSTAZOL 50 MG/1
50 TABLET ORAL 2 TIMES DAILY
COMMUNITY
Start: 2025-03-20

## 2025-04-03 RX ORDER — AMITRIPTYLINE HYDROCHLORIDE 10 MG/1
10 TABLET, FILM COATED ORAL NIGHTLY
Status: DISCONTINUED | OUTPATIENT
Start: 2025-04-04 | End: 2025-04-03

## 2025-04-03 RX ORDER — AMITRIPTYLINE HYDROCHLORIDE 10 MG/1
10 TABLET, FILM COATED ORAL NIGHTLY
COMMUNITY
Start: 2025-04-01

## 2025-04-03 RX ORDER — INSULIN ASPART 100 [IU]/ML
0-10 INJECTION, SOLUTION INTRAVENOUS; SUBCUTANEOUS
Status: DISCONTINUED | OUTPATIENT
Start: 2025-04-03 | End: 2025-04-05 | Stop reason: HOSPADM

## 2025-04-03 RX ORDER — HYDROCODONE BITARTRATE AND ACETAMINOPHEN 500; 5 MG/1; MG/1
TABLET ORAL
Status: DISCONTINUED | OUTPATIENT
Start: 2025-04-03 | End: 2025-04-05 | Stop reason: HOSPADM

## 2025-04-03 RX ORDER — VENLAFAXINE 37.5 MG/1
37.5 TABLET ORAL 2 TIMES DAILY
Status: DISCONTINUED | OUTPATIENT
Start: 2025-04-03 | End: 2025-04-05 | Stop reason: HOSPADM

## 2025-04-03 RX ORDER — CLOPIDOGREL BISULFATE 75 MG/1
75 TABLET ORAL DAILY
COMMUNITY

## 2025-04-03 RX ORDER — IBUPROFEN 200 MG
24 TABLET ORAL
Status: DISCONTINUED | OUTPATIENT
Start: 2025-04-03 | End: 2025-04-05 | Stop reason: HOSPADM

## 2025-04-03 RX ADMIN — PANTOPRAZOLE SODIUM 40 MG: 40 INJECTION, POWDER, FOR SOLUTION INTRAVENOUS at 12:04

## 2025-04-03 RX ADMIN — ROPINIROLE HYDROCHLORIDE 1 MG: 1 TABLET, FILM COATED ORAL at 08:04

## 2025-04-03 RX ADMIN — PANTOPRAZOLE SODIUM 8 MG/HR: 40 INJECTION, POWDER, FOR SOLUTION INTRAVENOUS at 11:04

## 2025-04-03 RX ADMIN — AMITRIPTYLINE HYDROCHLORIDE 10 MG: 10 TABLET, FILM COATED ORAL at 08:04

## 2025-04-03 RX ADMIN — SODIUM CHLORIDE 1000 ML: 9 INJECTION, SOLUTION INTRAVENOUS at 12:04

## 2025-04-03 RX ADMIN — INSULIN ASPART 5 UNITS: 100 INJECTION, SOLUTION INTRAVENOUS; SUBCUTANEOUS at 08:04

## 2025-04-03 RX ADMIN — VENLAFAXINE 37.5 MG: 37.5 TABLET ORAL at 08:04

## 2025-04-03 RX ADMIN — ACETAMINOPHEN 650 MG: 325 TABLET, FILM COATED ORAL at 07:04

## 2025-04-03 RX ADMIN — PANTOPRAZOLE SODIUM 8 MG/HR: 40 INJECTION, POWDER, FOR SOLUTION INTRAVENOUS at 07:04

## 2025-04-03 RX ADMIN — PREGABALIN 100 MG: 100 CAPSULE ORAL at 08:04

## 2025-04-03 RX ADMIN — LISINOPRIL 10 MG: 10 TABLET ORAL at 08:04

## 2025-04-03 RX ADMIN — PANTOPRAZOLE SODIUM 40 MG: 40 INJECTION, POWDER, FOR SOLUTION INTRAVENOUS at 01:04

## 2025-04-03 RX ADMIN — PANTOPRAZOLE SODIUM 8 MG/HR: 40 INJECTION, POWDER, FOR SOLUTION INTRAVENOUS at 02:04

## 2025-04-03 NOTE — ED PROVIDER NOTES
Encounter Date: 4/3/2025       History     Chief Complaint   Patient presents with    Weakness     Weakness for a few months with bloody stools for a few weeks.  reports patient looks pale. Hemoglobin 7 per patient, sent to ed for eval. Was supposed to have a peripheral angio today however blood count too low.      70-year-old female presents to ED for evaluation of generalized weakness.  Patient reports that over the last few weeks she has noted some intermittent bloody stools.  Patient reports that she had a peripheral angiogram done on her left leg 2 weeks ago and had labs done with a hemoglobin of 9.2.  States she was started on Plavix.  States she is supposed to have an angiogram of her right leg done this week and had preop labs done and called and told her hemoglobin was 7.1 yesterday.  Patient states that she has shortness of breath on exertion.  Pale in appearance.  States she does not has a history of anemia.  Has not never been transfused.    The history is provided by the patient. No  was used.     Review of patient's allergies indicates:   Allergen Reactions    Aspirin Rash and Swelling    Penicillins Hives and Swelling     Past Medical History:   Diagnosis Date    Depression     Diabetes mellitus     Fibromyalgia     Hyperlipidemia     Hypertension      Past Surgical History:   Procedure Laterality Date    ADENOIDECTOMY      BILATERAL TUBAL LIGATION      CARPAL TUNNEL RELEASE      x2     SECTION      DILATION AND CURETTAGE OF UTERUS      x3    HYSTERECTOMY      LIGATION OF HEMORRHOIDS      TONSILLECTOMY       Family History   Adopted: Yes     Social History[1]  Review of Systems   Constitutional:  Positive for fatigue. Negative for fever.   HENT:  Negative for sore throat.    Respiratory:  Negative for shortness of breath.    Cardiovascular:  Negative for chest pain.   Gastrointestinal:  Positive for blood in stool. Negative for abdominal pain, constipation, diarrhea,  nausea and vomiting.   Genitourinary:  Negative for dysuria.   Musculoskeletal:  Negative for back pain.   Skin:  Negative for rash.   Neurological:  Positive for weakness.   Hematological:  Does not bruise/bleed easily.       Physical Exam     Initial Vitals [04/03/25 1117]   BP Pulse Resp Temp SpO2   139/62 107 18 98.7 °F (37.1 °C) 99 %      MAP       --         Physical Exam    Nursing note and vitals reviewed.  Constitutional: She appears well-developed and well-nourished.   HENT:   Head: Normocephalic and atraumatic.   Right Ear: Tympanic membrane and external ear normal.   Left Ear: Tympanic membrane and external ear normal. Mouth/Throat: Uvula is midline, oropharynx is clear and moist and mucous membranes are normal. No trismus in the jaw. No uvula swelling. No oropharyngeal exudate, posterior oropharyngeal edema or posterior oropharyngeal erythema.   Eyes: Conjunctivae are normal. Pupils are equal, round, and reactive to light.   Neck: Neck supple.   Normal range of motion.  Cardiovascular:  Normal rate, regular rhythm and normal heart sounds.           Pulmonary/Chest: Breath sounds normal. She has no wheezes. She has no rhonchi. She has no rales.   Abdominal: Abdomen is soft. Bowel sounds are normal. There is no abdominal tenderness.   Genitourinary: Rectum:      Guaiac result positive.      No external hemorrhoid or internal hemorrhoid.   Guaiac positive stool. : Acceptable.   Genitourinary Comments: Exam performed with KENNY Ramos at bedside     Musculoskeletal:         General: Normal range of motion.      Cervical back: Normal range of motion and neck supple.     Neurological: She is alert and oriented to person, place, and time.   Skin: Skin is warm and dry.   Psychiatric: She has a normal mood and affect.         ED Course   Procedures  Labs Reviewed   COMPREHENSIVE METABOLIC PANEL - Abnormal       Result Value    Sodium 134 (*)     Potassium 4.8      Chloride 103      CO2 20 (*)      Glucose 336 (*)     Blood Urea Nitrogen 14.4      Creatinine 1.03 (*)     Calcium 9.0      Protein Total 6.8      Albumin 3.3 (*)     Globulin 3.5      Albumin/Globulin Ratio 0.9 (*)     Bilirubin Total 0.2      ALP 85      ALT 11      AST 15      eGFR 59      Anion Gap 11.0      BUN/Creatinine Ratio 14     CBC WITH DIFFERENTIAL - Abnormal    WBC 6.83      RBC 2.85 (*)     Hgb 6.2 (*)     Hct 21.4 (*)     MCV 75.1 (*)     MCH 21.8 (*)     MCHC 29.0 (*)     RDW 19.5 (*)     Platelet 232      MPV 10.9 (*)     Neut % 74.6      Lymph % 16.3      Mono % 6.3      Eos % 1.2      Basophil % 1.2      Imm Grans % 0.4      Neut # 5.10      Lymph # 1.11      Mono # 0.43      Eos # 0.08      Baso # 0.08      Imm Gran # 0.03      NRBC% 0.3     IRON AND TIBC - Abnormal    Iron Binding Capacity Unsaturated 428 (*)     Iron Level 25 (*)     Transferrin 428 (*)     Iron Binding Capacity Total 453 (*)     Iron Saturation 6 (*)    APTT - Normal    PTT 26.2     TROPONIN I - Normal    Troponin-I 0.045     PROTIME-INR - Normal    PT 13.5      INR 1.1      Narrative:     Protimes are used to monitor anticoagulant agents such as warfarin. PT INR values are based on the current patient normal mean and the BOGDAN value for the specific instrument reagent used.  **Routine theraputic target values for the INR are 2.0-3.0**   MAGNESIUM - Normal    Magnesium Level 1.70     FERRITIN - Normal    Ferritin Level 13.06     CBC W/ AUTO DIFFERENTIAL    Narrative:     The following orders were created for panel order CBC auto differential.  Procedure                               Abnormality         Status                     ---------                               -----------         ------                     CBC with Differential[[id number]]       Abnormal            Final result                 Please view results for these tests on the individual orders.   URINALYSIS, REFLEX TO URINE CULTURE   TYPE & SCREEN    Group & Rh AB POS      Indirect Clare  GEL NEG      Specimen Outdate 04/06/2025 23:59     ABORH RETYPE    ABORH Retype AB POS       EKG Readings: (Independently Interpreted)   Initial Reading: No STEMI. Rhythm: Normal Sinus Rhythm. Heart Rate: 86. Ectopy: No Ectopy. Conduction: Normal. ST Segments: Normal ST Segments. T Waves: Normal. Clinical Impression: Normal Sinus Rhythm       Imaging Results    None          Medications   0.9%  NaCl infusion (for blood administration) (has no administration in time range)   pantoprazole (PROTONIX) 40 mg in 0.9% NaCl 100 mL IVPB (MB+) (8 mg/hr Intravenous New Bag 4/3/25 1400)   ondansetron injection 4 mg (has no administration in time range)   acetaminophen tablet 650 mg (has no administration in time range)   glucose chewable tablet 16 g (has no administration in time range)   glucose chewable tablet 24 g (has no administration in time range)   dextrose 50% injection 12.5 g (has no administration in time range)   dextrose 50% injection 25 g (has no administration in time range)   glucagon (human recombinant) injection 1 mg (has no administration in time range)   pantoprazole injection 40 mg (40 mg Intravenous Given 4/3/25 1245)   sodium chloride 0.9% bolus 1,000 mL 1,000 mL (0 mLs Intravenous Stopped 4/3/25 1344)   pantoprazole injection 40 mg (40 mg Intravenous Given 4/3/25 1357)     Medical Decision Making  70-year-old female presents to ED for evaluation of generalized weakness.  Patient reports that over the last few weeks she has noted some intermittent bloody stools.  Patient reports that she had a peripheral angiogram done on her left leg 2 weeks ago and had labs done with a hemoglobin of 9.2.  States she was started on Plavix.  States she is supposed to have an angiogram of her right leg done this week and had preop labs done and called and told her hemoglobin was 7.1 yesterday.  Patient states that she has shortness of breath on exertion.  Pale in appearance.  States she does not has a history of anemia.   Has not never been transfused.    Differential diagnosis includes but isn't limited to anemia, blood loss, GI bleed    Amount and/or Complexity of Data Reviewed  Labs: ordered. Decision-making details documented in ED Course.  Discussion of management or test interpretation with external provider(s): Patient presents to ED for evaluation of generalized weakness with some intermittent bloody stools over the last couple of weeks.  Patient had an angiogram done 2 weeks ago where had outpatient labs done showing a hemoglobin of 9.2.  States she was called yesterday with labs stating that her hemoglobin had dropped to 7.1.  Recently started on Plavix due to angiogram.  Patient states that she has noticed an increased blood in her stool.  States that her stool is dark in color.  Denies any abdominal pain.  Hemoccult positive.  Abdomen is soft nontender nonsurgical.  Patient is pale in appearance.  Hemoglobin dropping today to 6.2.  Transfuse 2 units of blood.  Discussed case with GI recommends loading with Protonix 80 mg and starting a Protonix drip.  Will see in consult.  Discussed case with hospital medicine who will admit for further evaluation and treatment.  Discussed case with ED attending Dr. Park, he had face-to-face encounter with the patient.    Risk  OTC drugs.  Prescription drug management.  Decision regarding hospitalization.               ED Course as of 04/03/25 1910 Thu Apr 03, 2025   1232 Hemoglobin(!): 6.2 [SL]   1232 Hematocrit(!): 21.4 [SL]   1233 Sodium(!): 134 [SL]   1233 Glucose(!): 336 [SL]   1233 CO2(!): 20 [SL]   1233 Magnesium : 1.70 [SL]   1233 Troponin I: 0.045 [SL]      ED Course User Index  [SL] Tere Cole PA                           Clinical Impression:  Final diagnoses:  [R53.1] Weakness  [K92.2] Gastrointestinal hemorrhage, unspecified gastrointestinal hemorrhage type (Primary)  [D64.9] Anemia, unspecified type          ED Disposition Condition    Admit                   [place]las  JAREN Carranza  04/03/25 7230         [1]   Social History  Tobacco Use    Smoking status: Some Days     Types: Vaping with nicotine    Smokeless tobacco: Never    Tobacco comments:     was a cigarette smoker for 42 years   Substance Use Topics    Alcohol use: Not Currently        Tere Cole PA  04/03/25 3853

## 2025-04-03 NOTE — CONSULTS
Consult Note    Reason for Consult:      We were consulted to evaluate this patient for GIB, anemia.     HPI:     70-year-old  female known to Dr. Turner in the past with a PMH of fibromyalgia, HTN, DM, RLS, venous insufficiency, prior DVT, esophageal ring, hiatal hernia, PUD, H pylori, adenomatous colon polyps, chronic anemia, peripheral arterial disease.    Patient presented to the ED today with anemia on outpatient labs.  Patient states that she had left lower extremity peripheral angiogram about 2 weeks ago with intervention.  Preop labs notable for 9.2.  She was subsequently started on Plavix.  Yesterday, she had preop labs for the right lower extremity peripheral angiogram which was notable for hemoglobin of 7.1.  She was instructed to present to the ED today and her procedure was canceled.    On presentation, VSS.  Labs notable for microcytic anemia with hemoglobin 6.2, iron low 25,% sat low 6, TIBC high 453 ferritin on the low end of normal 13.06.  ED rectal exam noted black stool, guaiac positive.  Patient was bolused with Protonix and started on drip.  Plans to transfused 2 units packed red blood cells.  Patient was admitted and GI was consulted.    Patient states that despite her anemia she feels good.  She has no weakness, dizziness, lightheadedness, shortness of breath, etc..  She states that she has a bowel movement daily.  She suggests that her stool is brown in color with some black specks in it.  Denies tarry stool or hematochezia.  Admits to taking Aleve about once every other day for headaches.  Denies abdominal pain.  Denies nausea, vomiting, hematemesis, coffee-ground emesis.    Previous records reviewed...  Patient with chronic anemia and has been seen by Hematology in the past and has received iron infusions.  Most recent labs available to review ehre are from 11/2021 at which time hgb was 8.8.    EGD for anemia/dysphagia 08/2020:  Esophageal ring dilated to 20 mm, 3 cm hiatal hernia,  grade a esophagitis, 2 superficial antral ulcers, normal duodenum.  Gastric biopsies with H. pylori associated gastritis (resistant strains present for erythromycin and fluoroquinolones).  Patient was treated with Pylera.  Never completed eradication testing as ordered and have not seen the patient since.    Colonoscopy 2020 4 anemia:  2 mm TA ascending colon polyp removed, otherwise normal colon and terminal ileum.  Repeat recommended 5 years.      PCP:  Sumeet Mcnamara NP    Review of patient's allergies indicates:   Allergen Reactions    Aspirin Rash and Swelling    Penicillins Hives and Swelling        Current Medications[1]  Prescriptions Prior to Admission[2]    Past Medical History:  Past Medical History:   Diagnosis Date    Depression     Diabetes mellitus     Fibromyalgia     Hyperlipidemia     Hypertension       Past Surgical History:  Past Surgical History:   Procedure Laterality Date    ADENOIDECTOMY      BILATERAL TUBAL LIGATION      CARPAL TUNNEL RELEASE      x2     SECTION      DILATION AND CURETTAGE OF UTERUS      x3    HYSTERECTOMY      LIGATION OF HEMORRHOIDS      TONSILLECTOMY        Family History:  Family History   Adopted: Yes     Social History:  Social History     Tobacco Use    Smoking status: Some Days     Types: Vaping with nicotine    Smokeless tobacco: Never    Tobacco comments:     was a cigarette smoker for 42 years   Substance Use Topics    Alcohol use: Not Currently       Review of Systems:     Review of Systems   Constitutional:  Negative for appetite change, chills, diaphoresis, fatigue, fever and unexpected weight change.   HENT:  Negative for trouble swallowing.    Respiratory:  Negative for cough, chest tightness and shortness of breath.    Cardiovascular:  Negative for chest pain, palpitations and leg swelling.   Gastrointestinal:  Positive for blood in stool (occult). Negative for abdominal distention, abdominal pain, constipation, diarrhea, nausea, rectal pain and  vomiting.   Skin:  Positive for pallor. Negative for color change.   Neurological:  Negative for dizziness, weakness and light-headedness.   Psychiatric/Behavioral:  Negative for confusion.        Objective:     VITAL SIGNS: 24 HR MIN & MAX LAST    Temp  Min: 98.6 °F (37 °C)  Max: 98.7 °F (37.1 °C)  98.7 °F (37.1 °C)        BP  Min: 128/70  Max: 153/71  (!) 153/71     Pulse  Min: 84  Max: 107  92     Resp  Min: 15  Max: 21  (!) 21    SpO2  Min: 95 %  Max: 99 %  97 %      No intake or output data in the 24 hours ending 04/03/25 1545    Physical Exam  Constitutional:       General: She is not in acute distress.     Appearance: She is not ill-appearing.   HENT:      Head: Normocephalic and atraumatic.   Eyes:      General: No scleral icterus.     Extraocular Movements: Extraocular movements intact.   Cardiovascular:      Rate and Rhythm: Normal rate and regular rhythm.   Pulmonary:      Effort: Pulmonary effort is normal. No respiratory distress.   Abdominal:      General: Bowel sounds are normal. There is no distension.      Palpations: Abdomen is soft. There is no mass.      Tenderness: There is no abdominal tenderness. There is no guarding or rebound.   Musculoskeletal:         General: Normal range of motion.      Right lower leg: No edema.      Left lower leg: No edema.   Skin:     General: Skin is warm and dry.      Coloration: Skin is pale. Skin is not jaundiced.   Neurological:      Mental Status: She is alert and oriented to person, place, and time.   Psychiatric:         Mood and Affect: Mood and affect normal.           Recent Results (from the past 48 hours)   Comprehensive metabolic panel    Collection Time: 04/03/25 11:49 AM   Result Value Ref Range    Sodium 134 (L) 136 - 145 mmol/L    Potassium 4.8 3.5 - 5.1 mmol/L    Chloride 103 98 - 107 mmol/L    CO2 20 (L) 23 - 31 mmol/L    Glucose 336 (H) 82 - 115 mg/dL    Blood Urea Nitrogen 14.4 9.8 - 20.1 mg/dL    Creatinine 1.03 (H) 0.55 - 1.02 mg/dL    Calcium  9.0 8.4 - 10.2 mg/dL    Protein Total 6.8 5.8 - 7.6 gm/dL    Albumin 3.3 (L) 3.4 - 4.8 g/dL    Globulin 3.5 2.4 - 3.5 gm/dL    Albumin/Globulin Ratio 0.9 (L) 1.1 - 2.0 ratio    Bilirubin Total 0.2 <=1.5 mg/dL    ALP 85 40 - 150 unit/L    ALT 11 0 - 55 unit/L    AST 15 11 - 45 unit/L    eGFR 59 mL/min/1.73/m2    Anion Gap 11.0 mEq/L    BUN/Creatinine Ratio 14    APTT    Collection Time: 04/03/25 11:49 AM   Result Value Ref Range    PTT 26.2 23.2 - 33.7 seconds   Troponin I    Collection Time: 04/03/25 11:49 AM   Result Value Ref Range    Troponin-I 0.045 0.000 - 0.045 ng/mL   Type & Screen    Collection Time: 04/03/25 11:49 AM   Result Value Ref Range    Group & Rh AB POS     Indirect Clare GEL NEG     Specimen Outdate 04/06/2025 23:59    Protime-INR    Collection Time: 04/03/25 11:49 AM   Result Value Ref Range    PT 13.5 12.5 - 14.5 seconds    INR 1.1 <=1.3   Magnesium    Collection Time: 04/03/25 11:49 AM   Result Value Ref Range    Magnesium Level 1.70 1.60 - 2.60 mg/dL   CBC with Differential    Collection Time: 04/03/25 11:49 AM   Result Value Ref Range    WBC 6.83 4.50 - 11.50 x10(3)/mcL    RBC 2.85 (L) 4.20 - 5.40 x10(6)/mcL    Hgb 6.2 (L) 12.0 - 16.0 g/dL    Hct 21.4 (L) 37.0 - 47.0 %    MCV 75.1 (L) 80.0 - 94.0 fL    MCH 21.8 (L) 27.0 - 31.0 pg    MCHC 29.0 (L) 33.0 - 36.0 g/dL    RDW 19.5 (H) 11.5 - 17.0 %    Platelet 232 130 - 400 x10(3)/mcL    MPV 10.9 (H) 7.4 - 10.4 fL    Neut % 74.6 %    Lymph % 16.3 %    Mono % 6.3 %    Eos % 1.2 %    Basophil % 1.2 %    Imm Grans % 0.4 %    Neut # 5.10 2.1 - 9.2 x10(3)/mcL    Lymph # 1.11 0.6 - 4.6 x10(3)/mcL    Mono # 0.43 0.1 - 1.3 x10(3)/mcL    Eos # 0.08 0 - 0.9 x10(3)/mcL    Baso # 0.08 <=0.2 x10(3)/mcL    Imm Gran # 0.03 0.00 - 0.04 x10(3)/mcL    NRBC% 0.3 %   Prepare RBC 2 Units; 2    Collection Time: 04/03/25 11:49 AM   Result Value Ref Range    UNIT NUMBER [id number]     UNIT ABO/RH AB POS     DISPENSE STATUS Selected     Unit Expiration [id number]      Product Code Y7872P97     Unit Blood Type Code 8400     CROSSMATCH INTERPRETATION Compatible     UNIT NUMBER [id number]     UNIT ABO/RH AB POS     DISPENSE STATUS Issued     Unit Expiration [id number]     Product Code K4520D40     Unit Blood Type Code 8400     CROSSMATCH INTERPRETATION Compatible    Ferritin    Collection Time: 04/03/25 11:49 AM   Result Value Ref Range    Ferritin Level 13.06 4.63 - 204.00 ng/mL   Iron and TIBC    Collection Time: 04/03/25 11:49 AM   Result Value Ref Range    Iron Binding Capacity Unsaturated 428 (H) 70 - 310 ug/dL    Iron Level 25 (L) 50 - 170 ug/dL    Transferrin 428 (H) 173 - 360 mg/dL    Iron Binding Capacity Total 453 (H) 250 - 450 ug/dL    Iron Saturation 6 (L) 20 - 50 %   ABORH RETYPE    Collection Time: 04/03/25  1:58 PM   Result Value Ref Range    ABORH Retype AB POS        No results found.    Assessment / Plan:     70-year-old  female known to Dr. Turner in the past with a PMH of fibromyalgia, HTN, DM, RLS, venous insufficiency, prior DVT, esophageal ring, hiatal hernia, PUD, H pylori, adenomatous colon polyps, chronic anemia, PAD s/p LLE angio with intervention about 2 weeks ago and plavix initiated.  Here with worsening anemia on outpatient labs for planned preop RLE angio.    Acute on chronic anemia   Hgb was reportedly 9.2 about 2 weeks ago.  Suspect is around baseline based on chart review.   Hgb 6.2  FOBT+   Denies significant overt GIB    -Clear today. NPO after MN for EGD tomorrow.  -Continue ppi gtt for now  -Monitor H/H and transfuse as needed to Hgb 7.  Noted plans to give 2u prbcs.   -Monitor stools for bleeding      Thank you for allowing us to participate in this patient's care.         [1]   Current Facility-Administered Medications   Medication Dose Route Frequency Provider Last Rate Last Admin    0.9%  NaCl infusion (for blood administration)   Intravenous Q24H PRN Tere Cole PA        acetaminophen tablet 650 mg  650 mg Oral Q4H PRN Stephane  Bowen ARNETT PA-C        dextrose 50% injection 12.5 g  12.5 g Intravenous PRN Bowen Calderón PA-C        dextrose 50% injection 25 g  25 g Intravenous PRN Bowen Calderón PA-C        glucagon (human recombinant) injection 1 mg  1 mg Intramuscular PRN Bowen Calderón PA-C        glucose chewable tablet 16 g  16 g Oral PRN Bowen Calderón PA-C        glucose chewable tablet 24 g  24 g Oral PRN Bowen Calderón PA-C        ondansetron injection 4 mg  4 mg Intravenous Q8H PRN Bowen Calderón PA-C        pantoprazole (PROTONIX) 40 mg in 0.9% NaCl 100 mL IVPB (MB+)  8 mg/hr Intravenous Continuous Tere Cole PA 20 mL/hr at 04/03/25 1400 8 mg/hr at 04/03/25 1400   [2]   Medications Prior to Admission   Medication Sig Dispense Refill Last Dose/Taking    amitriptyline (ELAVIL) 10 MG tablet Take 10 mg by mouth every evening.   4/3/2025    cilostazoL (PLETAL) 50 MG Tab Take 50 mg by mouth 2 (two) times daily.   4/3/2025    clopidogreL (PLAVIX) 75 mg tablet Take 75 mg by mouth once daily.   4/3/2025    famotidine (PEPCID) 40 MG tablet Take 40 mg by mouth Daily.   4/2/2025    ibuprofen (ADVIL,MOTRIN) 800 MG tablet Take 800 mg by mouth 2 (two) times a day.   4/2/2025    lisinopriL-hydrochlorothiazide (PRINZIDE,ZESTORETIC) 10-12.5 mg per tablet Take 1 tablet by mouth once daily.   4/3/2025    metFORMIN (GLUCOPHAGE) 1000 MG tablet Take 1,000 mg by mouth 2 (two) times a day.   4/3/2025    pregabalin (LYRICA) 100 MG capsule Take 100 mg by mouth nightly as needed.   4/3/2025    rOPINIRole (REQUIP) 2 MG tablet Take 2 mg by mouth 3 (three) times daily.   4/3/2025    rosuvastatin 20 mg CpSP Take 5 mg by mouth nightly.   4/2/2025    traMADoL (ULTRAM-ER) 200 MG Tb24 Take 200 mg by mouth once daily at 6am.   4/3/2025    venlafaxine (EFFEXOR-XR) 37.5 MG 24 hr capsule Take 37.5 mg by mouth once daily.   4/3/2025    rOPINIRole (REQUIP) 3 MG tablet Take 3 mg by mouth nightly.       venlafaxine (EFFEXOR) 75 MG tablet Take 37.5 mg by mouth 2  (two) times a day.

## 2025-04-03 NOTE — FIRST PROVIDER EVALUATION
Medical screening examination initiated.  I have conducted a focused provider triage encounter, findings are as follows:    Brief history of present illness:  70WF w/hx of HTN presents to the emergency room with bloody stool intermittent for a few weeks that was told by her cardiologist she had a low H&H. 7&21 today and 9&28 a few weeks ago. Last colonoscopy/EGD 1 week ago. Currently on Plavix for stents/blockage LLE.       at bedside- [place]    There were no vitals filed for this visit.    Pertinent physical exam:  NAD.    Brief workup plan:  labs and imaging.    Preliminary workup initiated; this workup will be continued and followed by the physician or advanced practice provider that is assigned to the patient when roomed.

## 2025-04-03 NOTE — H&P
[facility] Hospital Medicine History & Physical Examination       Patient Name: Radha Day  MRN: [id number]  Patient Class: IP- Inpatient   Admission Date: 4/3/2025   Admitting Physician: [facility]   Length of Stay: 0  Attending Physician: Melissa Michel MD  Primary Care Provider: Sumeet Mcnamara NP  Face-to-Face encounter date: 04/03/2025  Chief Complaint: Weakness (Weakness for a few months with bloody stools for a few weeks.  reports patient looks pale. Hemoglobin 7 per patient, sent to ed for eval. Was supposed to have a peripheral angio today however blood count too low. )      Screening for Social Drivers for health:  Patient screened for food insecurity, housing instability, transportation needs, utility difficulties, and interpersonal safety (select all that apply as identified as concern)  []Housing or Food  []Transportation Needs  []Utility Difficulties  []Interpersonal safety  [x]None      Patient information was obtained from patient, patient's family, past medical records and ER records.  ED records were reviewed in detail and documented below    HISTORY OF PRESENT ILLNESS:   70-year-old  female with significant history of HTN, HLD, type 2 diabetes mellitus, restless leg syndrome, neuropathy, depression, fibromyalgia, anemia of chronic disease, colon polyp.  Patient does have PVD and is status post recent intervention on left side 2 weeks back and was initiated on Plavix, cilostazol.  Patient's cardiologist had plans to perform angiogram on right side and she had preop labs done which revealed severe anemia and therefore she was instructed to come to the ED. patient endorses generalized weakness, but denies any overt GI bleeding.  Patient was slightly hypotensive in the ED. lab significant for severe anemia with hemoglobin-6.2, iron-deficiency, hyperglycemia.  2 units PRBC transfusion ordered in the ED, FOBT was positive. patient was initiated on Protonix  drip.  Hospital medicine services consulted for admission, GI services also consulted        PAST MEDICAL HISTORY:   PVD status post recent intervention   HTN   Type 2 diabetes mellitus   Neuropathy   HLD   Depression  Restless leg syndrome   Fibromyalgia  Anemia of chronic disease   Colon polyp    PAST SURGICAL HISTORY:     Past Surgical History:   Procedure Laterality Date    ADENOIDECTOMY      BILATERAL TUBAL LIGATION      CARPAL TUNNEL RELEASE      x2     SECTION      DILATION AND CURETTAGE OF UTERUS      x3    HYSTERECTOMY      LIGATION OF HEMORRHOIDS      TONSILLECTOMY         ALLERGIES:   Aspirin and Penicillins    FAMILY HISTORY:   Reviewed and negative    SOCIAL HISTORY:     Denies alcohol use, illicit drug use, but patient vapes daily       HOME MEDICATIONS:     Prior to Admission medications    Medication Sig Start Date End Date Taking? Authorizing Provider   amitriptyline (ELAVIL) 10 MG tablet Take 10 mg by mouth every evening. 25  Yes Provider, Historical   cilostazoL (PLETAL) 50 MG Tab Take 50 mg by mouth 2 (two) times daily. 3/20/25  Yes Provider, Historical   clopidogreL (PLAVIX) 75 mg tablet Take 75 mg by mouth once daily.   Yes Provider, Historical   famotidine (PEPCID) 40 MG tablet Take 40 mg by mouth Daily. 3/27/25  Yes Provider, Historical   ibuprofen (ADVIL,MOTRIN) 800 MG tablet Take 800 mg by mouth 2 (two) times a day.   Yes Provider, Historical   lisinopriL-hydrochlorothiazide (PRINZIDE,ZESTORETIC) 10-12.5 mg per tablet Take 1 tablet by mouth once daily.   Yes Provider, Historical   metFORMIN (GLUCOPHAGE) 1000 MG tablet Take 1,000 mg by mouth 2 (two) times a day.   Yes Provider, Historical   pregabalin (LYRICA) 100 MG capsule Take 100 mg by mouth nightly as needed.   Yes Provider, Historical   rOPINIRole (REQUIP) 2 MG tablet Take 2 mg by mouth 3 (three) times daily.   Yes Provider, Historical   rosuvastatin 20 mg CpSP Take 5 mg by mouth nightly. 21  Yes Provider, Historical    traMADoL (ULTRAM-ER) 200 MG Tb24 Take 200 mg by mouth once daily at 6am.   Yes Provider, Historical   venlafaxine (EFFEXOR-XR) 37.5 MG 24 hr capsule Take 37.5 mg by mouth once daily.   Yes Provider, Historical   rOPINIRole (REQUIP) 3 MG tablet Take 3 mg by mouth nightly. 11/11/21   Provider, Historical   venlafaxine (EFFEXOR) 75 MG tablet Take 37.5 mg by mouth 2 (two) times a day.    Provider, Historical       REVIEW OF SYSTEMS:   Except as documented, all other systems reviewed and negative     PHYSICAL EXAM:     VITAL SIGNS: 24 HRS MIN & MAX LAST   Temp  Min: 98.6 °F (37 °C)  Max: 98.8 °F (37.1 °C) 98.8 °F (37.1 °C)   BP  Min: 128/70  Max: 153/77 (!) 153/77   Pulse  Min: 80  Max: 107  80   Resp  Min: 15  Max: 21 18   SpO2  Min: 94 %  Max: 99 % (!) 94 %     General appearance:  No acute distress  HENT: Atraumatic   Lungs: Clear to auscultation bilaterally.   Heart: RRR,No edema  Abdomen: Soft, non tender   Extremities: warm  Neuro:  Awake, alert, oriented x4  Psych/mental status: Appropriate mood and affect.      LABS AND IMAGING:     Recent Labs   Lab 04/03/25  1149   WBC 6.83   RBC 2.85*   HGB 6.2*   HCT 21.4*   MCV 75.1*   MCH 21.8*   MCHC 29.0*   RDW 19.5*      MPV 10.9*       Recent Labs   Lab 04/03/25  1149   *   K 4.8      CO2 20*   BUN 14.4   CREATININE 1.03*   CALCIUM 9.0   MG 1.70   ALBUMIN 3.3*   ALKPHOS 85   ALT 11   AST 15   BILITOT 0.2       Microbiology Results (last 7 days)       ** No results found for the last 168 hours. **             X-Ray Lumbar Spine 2 Or 3 Views  Narrative: EXAMINATION:  XR LUMBAR SPINE 2 OR 3 VIEWS    CLINICAL HISTORY:  Spinal stenosis, lumbar region without neurogenic claudication    TECHNIQUE:  Three views lumbar spine    COMPARISON:  09/26/2022    FINDINGS:  Redemonstrated grade 1 anterolisthesis at L4-5.  Moderate lumbosacral facet arthropathy, with mild multilevel endplate osteophytosis.  Vertebral body and disc heights are cell well-preserved.  The  SI joints demonstrate mild osteophytosis.  There is calcific atherosclerosis.  Impression: Moderate degenerative changes as above.  No acute fracture or subluxation of the lumbar spine.    Electronically signed by: Bam Gutierrez  Date:    10/05/2023  Time:    13:48      ASSESSMENT & PLAN:     Acute GI bleed-unclear source   Acute blood loss anemia secondary to above  History of PVD status post recent intervention on Plavix/cilostazol  Essential HTN-poor control   Type 2 diabetes mellitus with hyperglycemia   HLD   History of neuropathy   History of depression   Restless leg syndrome   Fibromyalgia   History of colon polyp   Prophylaxis    Initiated Protonix drip   2 units PRBC transfusion   Clear liquid diet   NPO after midnight for endoscopic evaluation tomorrow   Hold Plavix and cilostazol  Blood pressure poorly controlled   Resumed home meds-lisinopril, HCTZ, make adjustments as warranted based on subsequent BP  CBG is significantly elevated, only on metformin at home   Sliding scale for now   Hold off on long-acting since she is going to be NPO after midnight   Check A1c with a.m. labs   Resumed other home meds-Elavil, statin, Lyrica, ropinirole, venlafaxine   DVT prophylaxis-bilateral SCDs, no chemical prophylaxis given GI bleed    Critical care time-35 minutes  Critical care diagnosis-acute blood loss anemia requiring PRBC transfusion  Critical care interventions: Hands-on evaluation, review of labs/radiographs/records and discussions with patient     __________________________________________________________________________  INPATIENT LIST OF MEDICATIONS     Scheduled Meds:  Continuous Infusions:   pantoprazole (PROTONIX) IV infusion  8 mg/hr Intravenous Continuous 20 mL/hr at 04/03/25 1400 8 mg/hr at 04/03/25 1400     PRN Meds:.  Current Facility-Administered Medications:     0.9%  NaCl infusion (for blood administration), , Intravenous, Q24H PRN    acetaminophen, 650 mg, Oral, Q4H PRN    dextrose 50%, 12.5  g, Intravenous, PRN    dextrose 50%, 25 g, Intravenous, PRN    glucagon (human recombinant), 1 mg, Intramuscular, PRN    glucose, 16 g, Oral, PRN    glucose, 24 g, Oral, PRN    ondansetron, 4 mg, Intravenous, Q8H PRN      I, _ NP/PA have reviewed and discussed the case with  _   Please see the following addendum for further assessment and plan from there attending MD.    04/03/2025    ________________________________________________________________________________    MD Addendum:  Dr. MARIAN ---assumed care of this patient today at ---am/pm  For the patient encounter, I performed the substantive portion of the visit, I reviewed the NP/PA documentation, treatment plan, and medical decision making.  I had face to face time with this patient     A. History:    B. Physical exam:    C. Medical decision making:    Discharge Planning and Disposition: No mobility needs. Ambulating well. Good social support system.   Anticipated discharge    If patient was admitted under observational status it is with my approval/permission.        All diagnosis and differential diagnosis have been reviewed; assessment and plan has been documented; I have personally reviewed the labs and test results that are presently available; I have reviewed the patients medication list; I have reviewed the consulting providers response and recommendations. I have reviewed or attempted to review medical records based upon their availability.    All of the patient and family questions have been addressed and answered. Patient's is agreeable to the above stated plan. I will continue to monitor closely and make adjustments to medical management as needed.    If patient was admitted under observational status it is with my approval/permission.      Melissa Michel MD   04/03/2025

## 2025-04-04 ENCOUNTER — ANESTHESIA EVENT (OUTPATIENT)
Dept: ENDOSCOPY | Facility: HOSPITAL | Age: 70
End: 2025-04-04
Payer: MEDICARE

## 2025-04-04 ENCOUNTER — ANESTHESIA (OUTPATIENT)
Dept: ENDOSCOPY | Facility: HOSPITAL | Age: 70
End: 2025-04-04
Payer: MEDICARE

## 2025-04-04 LAB
ALBUMIN SERPL-MCNC: 3.2 G/DL (ref 3.4–4.8)
ALBUMIN/GLOB SERPL: 1 RATIO (ref 1.1–2)
ALP SERPL-CCNC: 81 UNIT/L (ref 40–150)
ALT SERPL-CCNC: 10 UNIT/L (ref 0–55)
ANION GAP SERPL CALC-SCNC: 8 MEQ/L
AST SERPL-CCNC: 10 UNIT/L (ref 11–45)
BASOPHILS # BLD AUTO: 0.08 X10(3)/MCL
BASOPHILS NFR BLD AUTO: 1.1 %
BILIRUB SERPL-MCNC: 1.2 MG/DL
BUN SERPL-MCNC: 9.8 MG/DL (ref 9.8–20.1)
CALCIUM SERPL-MCNC: 8.7 MG/DL (ref 8.4–10.2)
CHLORIDE SERPL-SCNC: 106 MMOL/L (ref 98–107)
CO2 SERPL-SCNC: 22 MMOL/L (ref 23–31)
CREAT SERPL-MCNC: 0.81 MG/DL (ref 0.55–1.02)
CREAT/UREA NIT SERPL: 12
EOSINOPHIL # BLD AUTO: 0.16 X10(3)/MCL (ref 0–0.9)
EOSINOPHIL NFR BLD AUTO: 2.3 %
ERYTHROCYTE [DISTWIDTH] IN BLOOD BY AUTOMATED COUNT: 19.4 % (ref 11.5–17)
EST. AVERAGE GLUCOSE BLD GHB EST-MCNC: 188.6 MG/DL
GFR SERPLBLD CREATININE-BSD FMLA CKD-EPI: >60 ML/MIN/1.73/M2
GLOBULIN SER-MCNC: 3.3 GM/DL (ref 2.4–3.5)
GLUCOSE SERPL-MCNC: 155 MG/DL (ref 82–115)
GLUCOSE SERPL-MCNC: 224 MG/DL (ref 70–110)
HBA1C MFR BLD: 8.2 %
HCT VFR BLD AUTO: 30.3 % (ref 37–47)
HGB BLD-MCNC: 9.7 G/DL (ref 12–16)
IMM GRANULOCYTES # BLD AUTO: 0.04 X10(3)/MCL (ref 0–0.04)
IMM GRANULOCYTES NFR BLD AUTO: 0.6 %
LYMPHOCYTES # BLD AUTO: 1.35 X10(3)/MCL (ref 0.6–4.6)
LYMPHOCYTES NFR BLD AUTO: 19.3 %
MCH RBC QN AUTO: 24.9 PG (ref 27–31)
MCHC RBC AUTO-ENTMCNC: 32 G/DL (ref 33–36)
MCV RBC AUTO: 77.7 FL (ref 80–94)
MONOCYTES # BLD AUTO: 0.58 X10(3)/MCL (ref 0.1–1.3)
MONOCYTES NFR BLD AUTO: 8.3 %
NEUTROPHILS # BLD AUTO: 4.78 X10(3)/MCL (ref 2.1–9.2)
NEUTROPHILS NFR BLD AUTO: 68.4 %
NRBC BLD AUTO-RTO: 0 %
OHS QRS DURATION: 84 MS
OHS QTC CALCULATION: 423 MS
PLATELET # BLD AUTO: 188 X10(3)/MCL (ref 130–400)
PMV BLD AUTO: 9.8 FL (ref 7.4–10.4)
POCT GLUCOSE: 224 MG/DL (ref 70–110)
POCT GLUCOSE: 231 MG/DL (ref 70–110)
POCT GLUCOSE: 231 MG/DL (ref 70–110)
POCT GLUCOSE: 256 MG/DL (ref 70–110)
POCT GLUCOSE: 275 MG/DL (ref 70–110)
POCT GLUCOSE: 407 MG/DL (ref 70–110)
POTASSIUM SERPL-SCNC: 3.8 MMOL/L (ref 3.5–5.1)
PROT SERPL-MCNC: 6.5 GM/DL (ref 5.8–7.6)
RBC # BLD AUTO: 3.9 X10(6)/MCL (ref 4.2–5.4)
SODIUM SERPL-SCNC: 136 MMOL/L (ref 136–145)
WBC # BLD AUTO: 6.99 X10(3)/MCL (ref 4.5–11.5)

## 2025-04-04 PROCEDURE — 25000003 PHARM REV CODE 250: Performed by: NURSE PRACTITIONER

## 2025-04-04 PROCEDURE — 37000009 HC ANESTHESIA EA ADD 15 MINS: Performed by: STUDENT IN AN ORGANIZED HEALTH CARE EDUCATION/TRAINING PROGRAM

## 2025-04-04 PROCEDURE — 36415 COLL VENOUS BLD VENIPUNCTURE: CPT | Performed by: INTERNAL MEDICINE

## 2025-04-04 PROCEDURE — 43239 EGD BIOPSY SINGLE/MULTIPLE: CPT | Performed by: STUDENT IN AN ORGANIZED HEALTH CARE EDUCATION/TRAINING PROGRAM

## 2025-04-04 PROCEDURE — 25000003 PHARM REV CODE 250

## 2025-04-04 PROCEDURE — 88305 TISSUE EXAM BY PATHOLOGIST: CPT | Performed by: STUDENT IN AN ORGANIZED HEALTH CARE EDUCATION/TRAINING PROGRAM

## 2025-04-04 PROCEDURE — 11000001 HC ACUTE MED/SURG PRIVATE ROOM

## 2025-04-04 PROCEDURE — 63600175 PHARM REV CODE 636 W HCPCS: Performed by: PHYSICIAN ASSISTANT

## 2025-04-04 PROCEDURE — 25000003 PHARM REV CODE 250: Performed by: INTERNAL MEDICINE

## 2025-04-04 PROCEDURE — 25000003 PHARM REV CODE 250: Performed by: PHYSICIAN ASSISTANT

## 2025-04-04 PROCEDURE — 83036 HEMOGLOBIN GLYCOSYLATED A1C: CPT | Performed by: INTERNAL MEDICINE

## 2025-04-04 PROCEDURE — 63600175 PHARM REV CODE 636 W HCPCS: Performed by: NURSE PRACTITIONER

## 2025-04-04 PROCEDURE — 80053 COMPREHEN METABOLIC PANEL: CPT | Performed by: PHYSICIAN ASSISTANT

## 2025-04-04 PROCEDURE — 63600175 PHARM REV CODE 636 W HCPCS: Performed by: STUDENT IN AN ORGANIZED HEALTH CARE EDUCATION/TRAINING PROGRAM

## 2025-04-04 PROCEDURE — 88342 IMHCHEM/IMCYTCHM 1ST ANTB: CPT

## 2025-04-04 PROCEDURE — 85025 COMPLETE CBC W/AUTO DIFF WBC: CPT | Performed by: PHYSICIAN ASSISTANT

## 2025-04-04 PROCEDURE — 25000003 PHARM REV CODE 250: Performed by: STUDENT IN AN ORGANIZED HEALTH CARE EDUCATION/TRAINING PROGRAM

## 2025-04-04 PROCEDURE — 63600175 PHARM REV CODE 636 W HCPCS: Performed by: INTERNAL MEDICINE

## 2025-04-04 PROCEDURE — 37000008 HC ANESTHESIA 1ST 15 MINUTES: Performed by: STUDENT IN AN ORGANIZED HEALTH CARE EDUCATION/TRAINING PROGRAM

## 2025-04-04 PROCEDURE — 63600175 PHARM REV CODE 636 W HCPCS

## 2025-04-04 PROCEDURE — 27201423 OPTIME MED/SURG SUP & DEVICES STERILE SUPPLY: Performed by: STUDENT IN AN ORGANIZED HEALTH CARE EDUCATION/TRAINING PROGRAM

## 2025-04-04 RX ORDER — SODIUM FERRIC GLUCONATE COMPLEX IN SUCROSE 12.5 MG/ML
250 INJECTION INTRAVENOUS DAILY
Status: DISCONTINUED | OUTPATIENT
Start: 2025-04-04 | End: 2025-04-05 | Stop reason: HOSPADM

## 2025-04-04 RX ORDER — LIDOCAINE HYDROCHLORIDE 10 MG/ML
INJECTION, SOLUTION EPIDURAL; INFILTRATION; INTRACAUDAL; PERINEURAL
Status: COMPLETED
Start: 2025-04-04 | End: 2025-04-04

## 2025-04-04 RX ORDER — OXYCODONE AND ACETAMINOPHEN 5; 325 MG/1; MG/1
1 TABLET ORAL
Refills: 0 | Status: CANCELLED | OUTPATIENT
Start: 2025-04-04

## 2025-04-04 RX ORDER — ACETAMINOPHEN 10 MG/ML
INJECTION, SOLUTION INTRAVENOUS
Status: COMPLETED
Start: 2025-04-04 | End: 2025-04-04

## 2025-04-04 RX ORDER — GLUCAGON 1 MG
1 KIT INJECTION
Status: CANCELLED | OUTPATIENT
Start: 2025-04-04

## 2025-04-04 RX ORDER — HYDRALAZINE HYDROCHLORIDE 20 MG/ML
10 INJECTION INTRAMUSCULAR; INTRAVENOUS ONCE
Status: COMPLETED | OUTPATIENT
Start: 2025-04-04 | End: 2025-04-04

## 2025-04-04 RX ORDER — PROPOFOL 10 MG/ML
VIAL (ML) INTRAVENOUS
Status: DISCONTINUED | OUTPATIENT
Start: 2025-04-04 | End: 2025-04-04

## 2025-04-04 RX ORDER — ACETAMINOPHEN 10 MG/ML
INJECTION, SOLUTION INTRAVENOUS
Status: DISCONTINUED | OUTPATIENT
Start: 2025-04-04 | End: 2025-04-04

## 2025-04-04 RX ORDER — SODIUM FERRIC GLUCONATE COMPLEX IN SUCROSE 12.5 MG/ML
250 INJECTION INTRAVENOUS DAILY
Status: DISCONTINUED | OUTPATIENT
Start: 2025-04-04 | End: 2025-04-04

## 2025-04-04 RX ORDER — LIDOCAINE HYDROCHLORIDE 10 MG/ML
INJECTION, SOLUTION EPIDURAL; INFILTRATION; INTRACAUDAL; PERINEURAL
Status: DISCONTINUED | OUTPATIENT
Start: 2025-04-04 | End: 2025-04-04

## 2025-04-04 RX ORDER — FENTANYL CITRATE 50 UG/ML
INJECTION, SOLUTION INTRAMUSCULAR; INTRAVENOUS
Status: DISCONTINUED | OUTPATIENT
Start: 2025-04-04 | End: 2025-04-04

## 2025-04-04 RX ORDER — HYDRALAZINE HYDROCHLORIDE 20 MG/ML
10 INJECTION INTRAMUSCULAR; INTRAVENOUS ONCE
Status: CANCELLED | OUTPATIENT
Start: 2025-04-04 | End: 2025-04-04

## 2025-04-04 RX ORDER — TRAMADOL HYDROCHLORIDE 50 MG/1
100 TABLET ORAL DAILY
Status: DISCONTINUED | OUTPATIENT
Start: 2025-04-04 | End: 2025-04-05 | Stop reason: HOSPADM

## 2025-04-04 RX ORDER — PANTOPRAZOLE SODIUM 40 MG/1
40 TABLET, DELAYED RELEASE ORAL DAILY
Status: DISCONTINUED | OUTPATIENT
Start: 2025-04-04 | End: 2025-04-05 | Stop reason: HOSPADM

## 2025-04-04 RX ORDER — INSULIN GLARGINE 100 [IU]/ML
16 INJECTION, SOLUTION SUBCUTANEOUS NIGHTLY
Status: DISCONTINUED | OUTPATIENT
Start: 2025-04-04 | End: 2025-04-05 | Stop reason: HOSPADM

## 2025-04-04 RX ORDER — FENTANYL CITRATE 50 UG/ML
INJECTION, SOLUTION INTRAMUSCULAR; INTRAVENOUS
Status: COMPLETED
Start: 2025-04-04 | End: 2025-04-04

## 2025-04-04 RX ORDER — HYDRALAZINE HYDROCHLORIDE 20 MG/ML
10 INJECTION INTRAMUSCULAR; INTRAVENOUS EVERY 4 HOURS PRN
Status: DISCONTINUED | OUTPATIENT
Start: 2025-04-04 | End: 2025-04-05 | Stop reason: HOSPADM

## 2025-04-04 RX ORDER — PROPOFOL 10 MG/ML
VIAL (ML) INTRAVENOUS
Status: COMPLETED
Start: 2025-04-04 | End: 2025-04-04

## 2025-04-04 RX ADMIN — PANTOPRAZOLE SODIUM 8 MG/HR: 40 INJECTION, POWDER, FOR SOLUTION INTRAVENOUS at 05:04

## 2025-04-04 RX ADMIN — INSULIN ASPART 4 UNITS: 100 INJECTION, SOLUTION INTRAVENOUS; SUBCUTANEOUS at 11:04

## 2025-04-04 RX ADMIN — AMITRIPTYLINE HYDROCHLORIDE 10 MG: 10 TABLET, FILM COATED ORAL at 08:04

## 2025-04-04 RX ADMIN — ACETAMINOPHEN 1000 MG: 10 INJECTION, SOLUTION INTRAVENOUS at 09:04

## 2025-04-04 RX ADMIN — LISINOPRIL 10 MG: 10 TABLET ORAL at 11:04

## 2025-04-04 RX ADMIN — ROPINIROLE HYDROCHLORIDE 2 MG: 1 TABLET, FILM COATED ORAL at 08:04

## 2025-04-04 RX ADMIN — PROPOFOL 20 MG: 10 INJECTION, EMULSION INTRAVENOUS at 09:04

## 2025-04-04 RX ADMIN — VENLAFAXINE 37.5 MG: 37.5 TABLET ORAL at 08:04

## 2025-04-04 RX ADMIN — HYDRALAZINE HYDROCHLORIDE 10 MG: 20 INJECTION INTRAMUSCULAR; INTRAVENOUS at 12:04

## 2025-04-04 RX ADMIN — LIDOCAINE HYDROCHLORIDE 50 MG: 10 INJECTION, SOLUTION EPIDURAL; INFILTRATION; INTRACAUDAL; PERINEURAL at 09:04

## 2025-04-04 RX ADMIN — ATORVASTATIN CALCIUM 10 MG: 10 TABLET, FILM COATED ORAL at 11:04

## 2025-04-04 RX ADMIN — HYDRALAZINE HYDROCHLORIDE 10 MG: 20 INJECTION INTRAMUSCULAR; INTRAVENOUS at 02:04

## 2025-04-04 RX ADMIN — INSULIN ASPART 10 UNITS: 100 INJECTION, SOLUTION INTRAVENOUS; SUBCUTANEOUS at 05:04

## 2025-04-04 RX ADMIN — PANTOPRAZOLE SODIUM 40 MG: 40 TABLET, DELAYED RELEASE ORAL at 12:04

## 2025-04-04 RX ADMIN — TRAMADOL HYDROCHLORIDE 100 MG: 50 TABLET, COATED ORAL at 11:04

## 2025-04-04 RX ADMIN — INSULIN ASPART 3 UNITS: 100 INJECTION, SOLUTION INTRAVENOUS; SUBCUTANEOUS at 08:04

## 2025-04-04 RX ADMIN — ROPINIROLE HYDROCHLORIDE 2 MG: 1 TABLET, FILM COATED ORAL at 11:04

## 2025-04-04 RX ADMIN — VENLAFAXINE 37.5 MG: 37.5 TABLET ORAL at 11:04

## 2025-04-04 RX ADMIN — PROPOFOL 50 MG: 10 INJECTION, EMULSION INTRAVENOUS at 09:04

## 2025-04-04 RX ADMIN — INSULIN GLARGINE 16 UNITS: 100 INJECTION, SOLUTION SUBCUTANEOUS at 06:04

## 2025-04-04 RX ADMIN — HYDROCHLOROTHIAZIDE 12.5 MG: 12.5 TABLET ORAL at 11:04

## 2025-04-04 RX ADMIN — SODIUM CHLORIDE, SODIUM GLUCONATE, SODIUM ACETATE, POTASSIUM CHLORIDE AND MAGNESIUM CHLORIDE: 526; 502; 368; 37; 30 INJECTION, SOLUTION INTRAVENOUS at 09:04

## 2025-04-04 RX ADMIN — FENTANYL CITRATE 25 MCG: 50 INJECTION, SOLUTION INTRAMUSCULAR; INTRAVENOUS at 09:04

## 2025-04-04 RX ADMIN — ROPINIROLE HYDROCHLORIDE 2 MG: 1 TABLET, FILM COATED ORAL at 03:04

## 2025-04-04 RX ADMIN — PREGABALIN 100 MG: 100 CAPSULE ORAL at 08:04

## 2025-04-04 RX ADMIN — ONDANSETRON 4 MG: 2 INJECTION INTRAMUSCULAR; INTRAVENOUS at 07:04

## 2025-04-04 RX ADMIN — SODIUM CHLORIDE 250 MG: 9 INJECTION, SOLUTION INTRAVENOUS at 11:04

## 2025-04-04 NOTE — PLAN OF CARE
04/04/25 1205   Discharge Assessment   Assessment Type Discharge Planning Assessment   Confirmed/corrected address, phone number and insurance Yes   Confirmed Demographics Correct on Facesheet   Source of Information patient   Communicated OTONIEL with patient/caregiver Date not available/Unable to determine   Reason For Admission GI bleed   People in Home spouse   Do you expect to return to your current living situation? Yes   Do you have help at home or someone to help you manage your care at home? Yes   Who are your caregiver(s) and their phone number(s)? Spouse but is independent   Prior to hospitilization cognitive status: Alert/Oriented   Current cognitive status: Alert/Oriented   Walking or Climbing Stairs Difficulty no   Dressing/Bathing Difficulty no   Equipment Currently Used at Home none   Readmission within 30 days? No   Patient currently being followed by outpatient case management? No   Do you currently have service(s) that help you manage your care at home? No   Do you take prescription medications? Yes   Do you have prescription coverage? Yes   Coverage medicare   Do you have any problems affording any of your prescribed medications? No   Is the patient taking medications as prescribed? yes   Who is going to help you get home at discharge? spouse   How do you get to doctors appointments? car, drives self   Are you on dialysis? No   Do you take coumadin? No   Discharge Plan A Home   Discharge Plan B Home   DME Needed Upon Discharge  none   Discharge Plan discussed with: Patient;Spouse/sig other   Name(s) and Number(s) Grover   Transition of Care Barriers None

## 2025-04-04 NOTE — PROVATION PATIENT INSTRUCTIONS
Discharge Summary/Instructions after an Endoscopic Procedure  Patient Name: Radha Day  Patient MRN: [id number]  Patient YOB: 1955  Friday, April 4, 2025  Grant Cervantes MD  Dear patient,  As a result of recent federal legislation (The Federal Cures Act), you may   receive lab or pathology results from your procedure in your [facility]   account before your physician is able to contact you. Your physician or   their representative will relay the results to you with their   recommendations at their soonest availability.  Thank you,  RESTRICTIONS:  During your procedure today, you received medications for sedation.  These   medications may affect your judgment, balance and coordination.  Therefore,   for 24 hours, you have the following restrictions:   - DO NOT drive a car, operate machinery, make legal/financial decisions,   sign important papers or drink alcohol.    ACTIVITY:  Today: no heavy lifting, straining or running due to procedural   sedation/anesthesia.  The following day: return to full activity including work.  DIET:  Eat and drink normally unless instructed otherwise.     TREATMENT FOR COMMON SIDE EFFECTS:  - Mild abdominal pain, nausea, belching, bloating or excessive gas:  rest,   eat lightly and use a heating pad.  - Sore Throat: treat with throat lozenges and/or gargle with warm salt   water.  - Because air was used during the procedure, expelling large amounts of air   from your rectum or belching is normal.  - If a bowel prep was taken, you may not have a bowel movement for 1-3 days.    This is normal.  SYMPTOMS TO WATCH FOR AND REPORT TO YOUR PHYSICIAN:  1. Abdominal pain or bloating, other than gas cramps.  2. Chest pain.  3. Back pain.  4. Signs of infection such as: chills or fever occurring within 24 hours   after the procedure.  5. Rectal bleeding, which would show as bright red, maroon, or black stools.   (A tablespoon of blood from the rectum is not serious, especially if    hemorrhoids are present.)  6. Vomiting.  7. Weakness or dizziness.  GO DIRECTLY TO THE NEAREST EMERGENCY ROOM IF YOU HAVE ANY OF THE FOLLOWING:      Difficulty breathing              Chills and/or fever over 101 F   Persistent vomiting and/or vomiting blood   Severe abdominal pain   Severe chest pain   Black, tarry stools   Bleeding- more than one tablespoon   Any other symptom or condition that you feel may need urgent attention  Your doctor recommends these additional instructions:  If any biopsies were taken, your doctors clinic will contact you in 1 to 2   weeks with any results.  Recommendations:  - Needs PO iron daily or QOD (no more, no less) with IV iron PRN ferritin   <20  - Return patient to [facility] for [facility] care.   - Needs outpatient colonoscopy for next availability  - Resume previous diet.   - Continue present medications.   - Await pathology results.   - Please call with questions or concerns. GI available if decompensates from   GI bleeding  Impressions:  EGD for severe BRYAN  - Normal esophagus.   - Large HH  - Gastritis.  Biopsied.   - Normal examined duodenum.  For questions, problems or results please call your physician - Grant Cervantes MD at Work:  [phone].  [facility] ED at [phone]  IF A COMPLICATION OR EMERGENCY SITUATION ARISES AND YOU ARE UNABLE TO REACH   YOUR PHYSICIAN - GO DIRECTLY TO THE EMERGENCY ROOM.  MD Grant Gutierrez MD  4/4/2025 10:02:41 AM  This report has been verified and signed electronically.  Dear patient,  As a result of recent federal legislation (The Federal Cures Act), you may   receive lab or pathology results from your procedure in your [facility]   account before your physician is able to contact you. Your physician or   their representative will relay the results to you with their   recommendations at their soonest availability.  Thank you,  PROVATION

## 2025-04-04 NOTE — PROGRESS NOTES
[facility]  Hospital Medicine Progress Note        Chief Complaint: Inpatient Follow-up    HPI:     70-year-old  female with significant history of HTN, HLD, type 2 diabetes mellitus, restless leg syndrome, neuropathy, depression, fibromyalgia, anemia of chronic disease, colon polyp.  Patient does have PVD and is status post recent intervention on left side 2 weeks back and was initiated on Plavix, cilostazol.  Patient's cardiologist had plans to perform angiogram on right side and she had preop labs done which revealed severe anemia and therefore she was instructed to come to the ED. patient endorses generalized weakness, but denies any overt GI bleeding.  Patient was slightly hypotensive in the ED. lab significant for severe anemia with hemoglobin-6.2, iron-deficiency, hyperglycemia.  2 units PRBC transfusion ordered in the ED, FOBT was positive. patient was initiated on Protonix drip.  Hospital medicine services consulted for admission, GI services also consulted.  GI planning for EGD 4/4, Plavix and cilostazol held    Interval Hx:   Patient seen at bedside after she returned back from endoscopy.  BP labile, otherwise hemodynamics stable, CBG is significantly high, no other new complaints, no overt bleeding    Objective/physical exam:  General: In no acute distress, afebrile  Chest: Clear to auscultation bilaterally  Heart: S1, S2, no appreciable murmur  Abdomen: Soft, nontender, BS +  MSK: Warm, no lower extremity edema, no clubbing or cyanosis  Neurologic: Alert and oriented x4,   VITAL SIGNS: 24 HRS MIN & MAX LAST   Temp  Min: 97.8 °F (36.6 °C)  Max: 98.8 °F (37.1 °C) 98.2 °F (36.8 °C)   BP  Min: 128/70  Max: 185/75 (!) 165/73   Pulse  Min: 63  Max: 107  106   Resp  Min: 15  Max: 21 18   SpO2  Min: 84 %  Max: 99 % (!) 90 %       Recent Labs   Lab 04/04/25  0220   WBC 6.99   RBC 3.90*   HGB 9.7*   HCT 30.3*   MCV 77.7*   MCH 24.9*   MCHC 32.0*   RDW 19.4*      MPV  9.8         Recent Labs   Lab 04/03/25  1149 04/04/25  0220   * 136   K 4.8 3.8    106   CO2 20* 22*   BUN 14.4 9.8   CREATININE 1.03* 0.81   CALCIUM 9.0 8.7   MG 1.70  --    ALBUMIN 3.3* 3.2*   ALKPHOS 85 81   ALT 11 10   AST 15 10*   BILITOT 0.2 1.2          Microbiology Results (last 7 days)       ** No results found for the last 168 hours. **             Scheduled Med:   amitriptyline  10 mg Oral QHS    atorvastatin  10 mg Oral Daily    lisinopriL  10 mg Oral Daily    And    hydroCHLOROthiazide  12.5 mg Oral Daily    pregabalin  100 mg Oral QHS    rOPINIRole  2 mg Oral TID    venlafaxine  37.5 mg Oral BID          Assessment/Plan:    Suspected acute GI bleed  Acute blood loss anemia secondary to above status post PRBC transfusion 4/3-improved  History of PVD status post recent intervention on Plavix/cilostazol, currently held  Essential HTN-labile BP  Type 2 diabetes mellitus with hyperglycemia   HLD   History of neuropathy   History of depression   Restless leg syndrome   Fibromyalgia   History of colon polyp   Prophylaxis       EGD done today-gastritis, hiatal hernia   No source of bleed identified   Recommending outpatient colonoscopy   Hemoglobin appropriately improved today after receiving transfusion yesterday  Advance diet post EGD  Will obtain clearance from GI to restart Plavix and cilostazol  Protonix drip discontinued and switch to p.o. Protonix  Continue home meds-lisinopril, HCTZ, BP is labile   Hold antihypertensives if BP less than 100/60  CBG is significantly elevated, only on metformin at home which is now held   A1c is only 8.2   Patient is on sliding scale   I have also added Lantus 16 units q.h.s.  Continue other home meds-Elavil, statin, Lyrica, ropinirole, venlafaxine   DVT prophylaxis-bilateral SCDs, holding off on chemical prophylaxis since FOBT was positive on admit    Monitor for 1 more day   If hemoglobin stable patient could possibly go home tomorrow       Melissa Michel  MD   04/04/2025

## 2025-04-04 NOTE — ANESTHESIA PREPROCEDURE EVALUATION
"                                                                                                             2025  Radha Day is a 70 y.o., female with significant history of HTN, HLD, type 2 diabetes mellitus, restless leg syndrome, neuropathy, depression, fibromyalgia, anemia of chronic disease, colon polyp.  Patient does have PVD and is status post recent intervention on left side 2 weeks back and was initiated on Plavix, cilostazol.  Patient's cardiologist had plans to perform angiogram on right side and she had preop labs done which revealed severe anemia and therefore she was instructed to come to the ED. patient endorses generalized weakness, but denies any overt GI bleeding.  Patient was slightly hypotensive in the ED. lab significant for severe anemia with hemoglobin-6.2, iron-deficiency, hyperglycemia.  2 units PRBC transfusion ordered in the ED, FOBT was positive. patient was initiated on Protonix drip.  Hospital medicine services consulted for admission, GI services also consulted, here today for EGD to evaluate. She received 2U pRBCs yesterday, no meds this AM.     Height: 5' 1" (1.549 m) (25)   Weight: 69.6 kg (153 lb 7 oz) (25)   BMI: 29 (25)   NPO Status: Not recorded   Allergies: ASPIRIN, PENICILLINS     Pre Vitals  Current as of 25 0853  BP: 165/73 Pulse: 106   Resp: SpO2: 90   Temp: 36.8 °C (98.2 °F)     Past Medical History   Diabetes mellitus Hypertension   Fibromyalgia Hyperlipidemia   Depression      Surgical History    DILATION AND CURETTAGE OF UTERUS  SECTION   BILATERAL TUBAL LIGATION HYSTERECTOMY   CARPAL TUNNEL RELEASE LIGATION OF HEMORRHOIDS   TONSILLECTOMY ADENOIDECTOMY      Latest Reference Range & Units 25 02:20   WBC 4.50 - 11.50 x10(3)/mcL 6.99   RBC 4.20 - 5.40 x10(6)/mcL 3.90 (L)   Hemoglobin 12.0 - 16.0 g/dL 9.7 (L)   Hematocrit 37.0 - 47.0 % 30.3 (L)   Platelet Count 130 - 400 x10(3)/mcL 188   Sodium 136 - 145 mmol/L 136 "   Potassium 3.5 - 5.1 mmol/L 3.8   Chloride 98 - 107 mmol/L 106   CO2 23 - 31 mmol/L 22 (L)   Anion Gap mEq/L 8.0   BUN 9.8 - 20.1 mg/dL 9.8   Creatinine 0.55 - 1.02 mg/dL 0.81   BUN/CREAT RATIO  12   eGFR mL/min/1.73/m2 >60   Glucose 82 - 115 mg/dL 155 (H)   Calcium 8.4 - 10.2 mg/dL 8.7   [facility] 11/11/21   Cannot access results,   EKG 4/3/25   Normal sinus rhythm   LVH with repolarization abnormality ( R in aVL , Tim product ,   Romhilt-Pemberton )   Cannot rule out Septal infarct ,age undetermined     Pre-op Assessment    I have reviewed the Patient Summary Reports.    I have reviewed the NPO Status.   I have reviewed the Medications.     Review of Systems  Anesthesia Hx:   History of prior surgery of interest to airway management or planning:  Previous anesthesia: General        Denies Family Hx of Anesthesia complications.    Denies Personal Hx of Anesthesia complications.                    Social:  Smoker, No Alcohol Use       Hematology/Oncology:       -- Anemia:                                  Cardiovascular:  Exercise tolerance: good   Hypertension, poorly controlled              ECG has been reviewed.                      Hypertension         Neurological:    Neuromuscular Disease,                                 Neuromuscular Disease   Endocrine:  Diabetes, well controlled, type 2    Diabetes                      Psych:  Psychiatric History                  Physical Exam  General: Well nourished, Cooperative, Alert and Oriented    Airway:  Mallampati: II / I  Mouth Opening: Normal  TM Distance: Normal  Tongue: Normal  Neck ROM: Normal ROM    Dental:  Intact  Some missing teeth   Chest/Lungs:  Clear to auscultation, Normal Respiratory Rate    Heart:  Rate: Tachycardia  Rhythm: Regular Rhythm  Sounds: Normal    Abdomen:  Normal, Soft, Nontender        Anesthesia Plan  Type of Anesthesia, risks & benefits discussed:    Anesthesia Type: Gen Natural Airway  Intra-op Monitoring Plan: Standard ASA Monitors  Post  Op Pain Control Plan: IV/PO Opioids PRN  (medical reason for not using multimodal pain management)  Induction:  IV  Informed Consent: Informed consent signed with the Patient and all parties understand the risks and agree with anesthesia plan.  All questions answered. Patient consented to blood products? Yes  ASA Score: 3  Day of Surgery Review of History & Physical: H&P Update referred to the surgeon/provider.    Ready For Surgery From Anesthesia Perspective.     .

## 2025-04-04 NOTE — TRANSFER OF CARE
"Anesthesia Transfer of Care Note    Patient: Radha Day    Procedure(s) Performed: Procedure(s) (LRB):  EGD (N/A)    Patient location: GI    Anesthesia Type: MAC    Transport from OR: Transported from OR on 2-3 L/min O2 by NC with adequate spontaneous ventilation    Post pain: adequate analgesia    Post assessment: no apparent anesthetic complications    Post vital signs: stable    Level of consciousness: responds to stimulation    Nausea/Vomiting: no nausea/vomiting    Complications: none    Transfer of care protocol was followed      Last vitals: Visit Vitals  /70 (BP Location: Right arm, Patient Position: Lying)   Pulse 103   Temp 36.5 °C (97.7 °F) (Skin)   Resp 14   Ht 5' 1" (1.549 m)   Wt 69.6 kg (153 lb 7 oz)   SpO2 97%   Breastfeeding No   BMI 28.99 kg/m²     "

## 2025-04-04 NOTE — ANESTHESIA POSTPROCEDURE EVALUATION
Anesthesia Post Evaluation    Patient: Radha Day    Procedure(s) Performed: Procedure(s) (LRB):  EGD (N/A)    Final Anesthesia Type: general      Patient location during evaluation: GI PACU  Patient participation: Yes- Able to Participate  Level of consciousness: awake and alert and oriented  Post-procedure vital signs: reviewed and stable  Pain management: adequate  Airway patency: patent    PONV status at discharge: No PONV  Anesthetic complications: no      Cardiovascular status: hemodynamically stable  Respiratory status: unassisted, spontaneous ventilation and nasal cannula  Hydration status: euvolemic  Follow-up not needed.              Vitals Value Taken Time   /78 04/04/25 10:25   Temp 36.5 °C (97.7 °F) 04/04/25 10:08   Pulse 79 04/04/25 10:25   Resp 18 04/04/25 10:25   SpO2 96 % 04/04/25 10:25         No case tracking events are documented in the log.      Pain/Celina Score: Pain Rating Prior to Med Admin: 6 (4/4/2025 11:50 AM)  Pain Rating Post Med Admin: 3 (4/3/2025  8:46 PM)  Celina Score: 8 (4/4/2025 10:05 AM)

## 2025-04-05 VITALS
SYSTOLIC BLOOD PRESSURE: 143 MMHG | WEIGHT: 153.44 LBS | HEART RATE: 82 BPM | TEMPERATURE: 98 F | OXYGEN SATURATION: 92 % | HEIGHT: 61 IN | BODY MASS INDEX: 28.97 KG/M2 | DIASTOLIC BLOOD PRESSURE: 78 MMHG | RESPIRATION RATE: 18 BRPM

## 2025-04-05 PROBLEM — D64.9 ANEMIA: Status: ACTIVE | Noted: 2025-04-05

## 2025-04-05 LAB
ALBUMIN SERPL-MCNC: 3 G/DL (ref 3.4–4.8)
ALBUMIN/GLOB SERPL: 1 RATIO (ref 1.1–2)
ALP SERPL-CCNC: 82 UNIT/L (ref 40–150)
ALT SERPL-CCNC: 8 UNIT/L (ref 0–55)
ANION GAP SERPL CALC-SCNC: 9 MEQ/L
AST SERPL-CCNC: 9 UNIT/L (ref 11–45)
BASOPHILS # BLD AUTO: 0.06 X10(3)/MCL
BASOPHILS NFR BLD AUTO: 0.8 %
BILIRUB SERPL-MCNC: 0.4 MG/DL
BUN SERPL-MCNC: 10.2 MG/DL (ref 9.8–20.1)
CALCIUM SERPL-MCNC: 8.3 MG/DL (ref 8.4–10.2)
CHLORIDE SERPL-SCNC: 103 MMOL/L (ref 98–107)
CO2 SERPL-SCNC: 22 MMOL/L (ref 23–31)
CREAT SERPL-MCNC: 0.87 MG/DL (ref 0.55–1.02)
CREAT/UREA NIT SERPL: 12
EOSINOPHIL # BLD AUTO: 0.14 X10(3)/MCL (ref 0–0.9)
EOSINOPHIL NFR BLD AUTO: 1.9 %
ERYTHROCYTE [DISTWIDTH] IN BLOOD BY AUTOMATED COUNT: 20 % (ref 11.5–17)
GFR SERPLBLD CREATININE-BSD FMLA CKD-EPI: >60 ML/MIN/1.73/M2
GLOBULIN SER-MCNC: 3 GM/DL (ref 2.4–3.5)
GLUCOSE SERPL-MCNC: 151 MG/DL (ref 70–110)
GLUCOSE SERPL-MCNC: 151 MG/DL (ref 82–115)
HCT VFR BLD AUTO: 32 % (ref 37–47)
HGB BLD-MCNC: 9.9 G/DL (ref 12–16)
IMM GRANULOCYTES # BLD AUTO: 0.05 X10(3)/MCL (ref 0–0.04)
IMM GRANULOCYTES NFR BLD AUTO: 0.7 %
LYMPHOCYTES # BLD AUTO: 1.2 X10(3)/MCL (ref 0.6–4.6)
LYMPHOCYTES NFR BLD AUTO: 16.7 %
MCH RBC QN AUTO: 24.8 PG (ref 27–31)
MCHC RBC AUTO-ENTMCNC: 30.9 G/DL (ref 33–36)
MCV RBC AUTO: 80.2 FL (ref 80–94)
MONOCYTES # BLD AUTO: 0.65 X10(3)/MCL (ref 0.1–1.3)
MONOCYTES NFR BLD AUTO: 9 %
NEUTROPHILS # BLD AUTO: 5.1 X10(3)/MCL (ref 2.1–9.2)
NEUTROPHILS NFR BLD AUTO: 70.9 %
NRBC BLD AUTO-RTO: 0.3 %
PLATELET # BLD AUTO: 214 X10(3)/MCL (ref 130–400)
PMV BLD AUTO: 10.3 FL (ref 7.4–10.4)
POCT GLUCOSE: 145 MG/DL (ref 70–110)
POCT GLUCOSE: 229 MG/DL (ref 70–110)
POCT GLUCOSE: 307 MG/DL (ref 70–110)
POTASSIUM SERPL-SCNC: 4.1 MMOL/L (ref 3.5–5.1)
PROT SERPL-MCNC: 6 GM/DL (ref 5.8–7.6)
RBC # BLD AUTO: 3.99 X10(6)/MCL (ref 4.2–5.4)
SODIUM SERPL-SCNC: 134 MMOL/L (ref 136–145)
WBC # BLD AUTO: 7.2 X10(3)/MCL (ref 4.5–11.5)

## 2025-04-05 PROCEDURE — 25000003 PHARM REV CODE 250: Performed by: STUDENT IN AN ORGANIZED HEALTH CARE EDUCATION/TRAINING PROGRAM

## 2025-04-05 PROCEDURE — 85025 COMPLETE CBC W/AUTO DIFF WBC: CPT | Performed by: INTERNAL MEDICINE

## 2025-04-05 PROCEDURE — 63600175 PHARM REV CODE 636 W HCPCS: Performed by: STUDENT IN AN ORGANIZED HEALTH CARE EDUCATION/TRAINING PROGRAM

## 2025-04-05 PROCEDURE — 36415 COLL VENOUS BLD VENIPUNCTURE: CPT | Performed by: INTERNAL MEDICINE

## 2025-04-05 PROCEDURE — 25000003 PHARM REV CODE 250: Performed by: PHYSICIAN ASSISTANT

## 2025-04-05 PROCEDURE — 63600175 PHARM REV CODE 636 W HCPCS: Performed by: INTERNAL MEDICINE

## 2025-04-05 PROCEDURE — 80053 COMPREHEN METABOLIC PANEL: CPT | Performed by: INTERNAL MEDICINE

## 2025-04-05 PROCEDURE — 25000003 PHARM REV CODE 250: Performed by: INTERNAL MEDICINE

## 2025-04-05 RX ORDER — FERROUS SULFATE 325(65) MG
325 TABLET ORAL
Qty: 90 TABLET | Refills: 0 | Status: SHIPPED | OUTPATIENT
Start: 2025-04-05 | End: 2025-07-04

## 2025-04-05 RX ORDER — GLIMEPIRIDE 1 MG/1
1 TABLET ORAL
Qty: 90 TABLET | Refills: 0 | Status: SHIPPED | OUTPATIENT
Start: 2025-04-05 | End: 2025-04-06

## 2025-04-05 RX ORDER — PANTOPRAZOLE SODIUM 40 MG/1
40 TABLET, DELAYED RELEASE ORAL DAILY
Qty: 60 TABLET | Refills: 0 | Status: SHIPPED | OUTPATIENT
Start: 2025-04-06 | End: 2025-04-06

## 2025-04-05 RX ADMIN — VENLAFAXINE 37.5 MG: 37.5 TABLET ORAL at 09:04

## 2025-04-05 RX ADMIN — ATORVASTATIN CALCIUM 10 MG: 10 TABLET, FILM COATED ORAL at 09:04

## 2025-04-05 RX ADMIN — INSULIN ASPART 4 UNITS: 100 INJECTION, SOLUTION INTRAVENOUS; SUBCUTANEOUS at 11:04

## 2025-04-05 RX ADMIN — ROPINIROLE HYDROCHLORIDE 2 MG: 1 TABLET, FILM COATED ORAL at 09:04

## 2025-04-05 RX ADMIN — LISINOPRIL 10 MG: 10 TABLET ORAL at 09:04

## 2025-04-05 RX ADMIN — HYDROCHLOROTHIAZIDE 12.5 MG: 12.5 TABLET ORAL at 09:04

## 2025-04-05 RX ADMIN — PANTOPRAZOLE SODIUM 40 MG: 40 TABLET, DELAYED RELEASE ORAL at 09:04

## 2025-04-05 RX ADMIN — SODIUM CHLORIDE 250 MG: 9 INJECTION, SOLUTION INTRAVENOUS at 09:04

## 2025-04-05 RX ADMIN — TRAMADOL HYDROCHLORIDE 100 MG: 50 TABLET, COATED ORAL at 09:04

## 2025-04-05 RX ADMIN — ACETAMINOPHEN 650 MG: 325 TABLET, FILM COATED ORAL at 10:04

## 2025-04-05 NOTE — NURSING
Patient discharged. Discharged instructions given and explained to patient. Verbalized understanding. Transported per wc per transport in stable condition.

## 2025-04-05 NOTE — DISCHARGE SUMMARY
[facility] Hospital Medicine Discharge Summary    Admit Date: 4/3/2025  Discharge Date and Time: 4/5/202510:46 AM  Admitting Physician:  Team  Discharging Physician: Melissa Michel MD.  Primary Care Physician: Sumeet Mcnamara NP      Discharge Diagnoses:    Suspected acute GI bleed  Acute blood loss anemia secondary to above status post PRBC transfusion 4/3-improved  History of PVD status post recent intervention on Plavix/cilostazol, currently held  Essential HTN-labile BP  Type 2 diabetes mellitus with hyperglycemia   HLD   History of neuropathy   History of depression   Restless leg syndrome   Fibromyalgia   History of colon polyp     Hospital Course:     70-year-old  female with significant history of HTN, HLD, type 2 diabetes mellitus, restless leg syndrome, neuropathy, depression, fibromyalgia, anemia of chronic disease, colon polyp.  Patient does have PVD and is status post recent intervention on left side 2 weeks back and was initiated on Plavix, cilostazol.  Patient's cardiologist had plans to perform angiogram on right side and she had preop labs done which revealed severe anemia and therefore she was instructed to come to the ED. patient endorses generalized weakness, but denies any overt GI bleeding.  Patient was slightly hypotensive in the ED. lab significant for severe anemia with hemoglobin-6.2, iron-deficiency, hyperglycemia.  2 units PRBC transfusion ordered in the ED, FOBT was positive. patient was initiated on Protonix drip.  Hospital medicine services consulted for admission, GI services also consulted.  GI planning for EGD 4/4, Plavix and cilostazol held .  EGD done on 04/04 with gastritis/hiatal hernia, no source of bleed identified, recommending outpatient colonoscopy hemoglobin appropriately improved after PRBC transfusion on 04/04, diet advanced post EGD, Protonix drip discontinued and switch to p.o. Protonix, BP labile on home medications, closely  monitoring, CBG is significantly elevated, only on metformin at home which is now held, A1c 8.2, Lantus added while in-house.  Patient was re-evaluated on 04/05.  Cleared for Plavix and cilostazol by GI, resumed both, hemoglobin stable, no more overt bleeding, hemodynamics stable, CBG improved, since the patient was cleared by GI Services and was hemodynamically and symptomatically stable it was decided to discharge the patient home on 04/05, discharge medications per med rec, follow up with PCP, glimepiride added to metformin for better control of CBG.  Treatment plans discussed with the patient and she voiced understanding to everything explained  Vitals:  VITAL SIGNS: 24 HRS MIN & MAX LAST   Temp  Min: 97.5 °F (36.4 °C)  Max: 98.2 °F (36.8 °C) 98.2 °F (36.8 °C)   BP  Min: 95/56  Max: 154/79 (!) 141/62   Pulse  Min: 68  Max: 86  71   Resp  Min: 14  Max: 19 18   SpO2  Min: 86 %  Max: 94 % (!) 92 %       Physical Exam:  General appearance:  No acute distress  HENT: Atraumatic   Lungs: Clear to auscultation bilaterally.   Heart: RRR,No edema  Abdomen: Soft, non tender   Extremities: warm  Neuro:  Awake, alert, oriented x4  Psych/mental status: Appropriate mood and affect.      Procedures Performed: No admission procedures for hospital encounter.     Significant Diagnostic Studies: See Full reports for all details    Recent Labs   Lab 04/03/25  1149 04/03/25  2229 04/04/25  0220 04/05/25  0407   WBC 6.83  --  6.99 7.20   RBC 2.85*  --  3.90* 3.99*   HGB 6.2* 8.8* 9.7* 9.9*   HCT 21.4* 28.1* 30.3* 32.0*   MCV 75.1*  --  77.7* 80.2   MCH 21.8*  --  24.9* 24.8*   MCHC 29.0*  --  32.0* 30.9*   RDW 19.5*  --  19.4* 20.0*     --  188 214   MPV 10.9*  --  9.8 10.3       Recent Labs   Lab 04/03/25  1149 04/04/25 0220 04/05/25  0407   * 136 134*   K 4.8 3.8 4.1    106 103   CO2 20* 22* 22*   BUN 14.4 9.8 10.2   CREATININE 1.03* 0.81 0.87   CALCIUM 9.0 8.7 8.3*   MG 1.70  --   --    ALBUMIN 3.3* 3.2* 3.0*    ALKPHOS 85 81 82   ALT 11 10 8   AST 15 10* 9*   BILITOT 0.2 1.2 0.4        Microbiology Results (last 7 days)       ** No results found for the last 168 hours. **             X-Ray Chest AP Portable  Narrative: EXAMINATION:  XR CHEST AP PORTABLE    CLINICAL HISTORY:  shortness of breath;    TECHNIQUE:  Single frontal view of the chest was performed.    COMPARISON:  None    FINDINGS:  Examination reveals mediastinal silhouette to be within normal limits cardiac silhouette is at the upper limits of normal to mildly enlarged some increase interstitial markings are identified mild degree of congestion cannot be completely excluded.    No focal consolidative changes atelectases effusions or pneumothoraces.    Hiatus hernia is identified  Impression: Mild cardiomegaly with slight increase in interstitial and pulmonary vascular markings mild degree of congestion cannot be excluded.    Hiatus hernia    Electronically signed by: Cj Beavers  Date:    04/04/2025  Time:    08:46         Medication List        START taking these medications      ferrous sulfate 325 mg (65 mg iron) Tab tablet  Commonly known as: FEOSOL  Take 1 tablet (325 mg total) by mouth daily with breakfast.     glimepiride 1 MG tablet  Commonly known as: AMARYL  Take 1 tablet (1 mg total) by mouth before breakfast.     pantoprazole 40 MG tablet  Commonly known as: PROTONIX  Take 1 tablet (40 mg total) by mouth once daily.  Start taking on: April 6, 2025            CHANGE how you take these medications      venlafaxine 75 MG tablet  Commonly known as: EFFEXOR  What changed: Another medication with the same name was removed. Continue taking this medication, and follow the directions you see here.            CONTINUE taking these medications      amitriptyline 10 MG tablet  Commonly known as: ELAVIL     cilostazoL 50 MG Tab  Commonly known as: PLETAL     clopidogreL 75 mg tablet  Commonly known as: PLAVIX     lisinopriL-hydrochlorothiazide 10-12.5 mg  per tablet  Commonly known as: PRINZIDE,ZESTORETIC     metFORMIN 1000 MG tablet  Commonly known as: GLUCOPHAGE     pregabalin 100 MG capsule  Commonly known as: LYRICA     * rOPINIRole 2 MG tablet  Commonly known as: REQUIP     * rOPINIRole 3 MG tablet  Commonly known as: REQUIP     rosuvastatin 20 mg Cpsp     traMADoL 200 MG Tb24  Commonly known as: ULTRAM-ER           * This list has 2 medication(s) that are the same as other medications prescribed for you. Read the directions carefully, and ask your doctor or other care provider to review them with you.                STOP taking these medications      famotidine 40 MG tablet  Commonly known as: PEPCID     ibuprofen 800 MG tablet  Commonly known as: ADVIL,MOTRIN               Where to Get Your Medications        These medications were sent to [id number] - [place] [address] AT [facility] & [place] [address], [facility] [address]      Phone: [phone]   ferrous sulfate 325 mg (65 mg iron) Tab tablet  glimepiride 1 MG tablet  pantoprazole 40 MG tablet          Explained in detail to the patient about the discharge plan, medications, and follow-up visits. Pt understands and agrees with the treatment plan  Discharge Disposition:     Discharged Condition: stable  Diet-   Dietary Orders (From admission, onward)       Start     Ordered    04/04/25 1843  Diet Consistent Carbohydrate 2000 Calories (up to 75 gm per meal); Standard Tray  Diet effective now        Question Answer Comment   Total calories / carbs: 2000 Calories (up to 75 gm per meal)    Tray type: Standard Tray        04/04/25 1843                   Medications Per DC med rec  Activities as tolerated   Follow-up Information       Sumeet Mcnamara NP Follow up in 1 week(s).    Specialty: Family Medicine  Contact information:  [address] [place] [address] [address] [place] [address]  [phone]               Grant Cervantes MD Follow up in 1  week(s).    Specialty: Gastroenterology  Contact information:  [address]  [address]  [place] [address]  [phone]               Hardy Bond MD Follow up in 1 week(s).    Specialty: Cardiology  Contact information:  [address] [place] [place] [address]  [phone]                           For further questions contact hospitalist office    Discharge time 33 minutes    For worsening symptoms, chest pain, shortness of breath, increased abdominal pain, high grade fever, stroke or stroke like symptoms, immediately go to the nearest Emergency Room or call 911 as soon as possible.      Melissa Israel M.D on 4/5/2025. at 10:46 AM.

## 2025-04-06 RX ORDER — PANTOPRAZOLE SODIUM 40 MG/1
40 TABLET, DELAYED RELEASE ORAL
Qty: 60 TABLET | Refills: 0 | Status: SHIPPED | OUTPATIENT
Start: 2025-04-06

## 2025-04-06 RX ORDER — GLIMEPIRIDE 1 MG/1
1 TABLET ORAL
Qty: 90 TABLET | Refills: 0 | Status: SHIPPED | OUTPATIENT
Start: 2025-04-06

## 2025-04-07 ENCOUNTER — PATIENT OUTREACH (OUTPATIENT)
Dept: ADMINISTRATIVE | Facility: CLINIC | Age: 70
End: 2025-04-07
Payer: MEDICARE

## 2025-04-07 NOTE — PROGRESS NOTES
C3 nurse attempted to contact Radha Day for a TCC post hospital discharge follow up call. No answer. LVM requesting a callback at [phone].    The patient does not have a scheduled HOSFU appointment. Message sent to GI's staff to assist with HOSFU appointment scheduling, as the patient has a [facility] PCP and unable to route message to their office.

## 2025-04-08 LAB — PSYCHE PATHOLOGY RESULT: NORMAL

## 2025-04-08 NOTE — PROGRESS NOTES
3rd attempt-C3 nurse attempted to contact Radha Day for a TCC post hospital discharge follow up call. No answer. LVM requesting a callback at [phone], and OOC#.    The patient does not have a scheduled HOSFU appointment. Message previously sent to GI's staff to assist with HOSFU appointment scheduling, as the patient has a [facility] PCP and unable to route message to their office.

## 2025-04-08 NOTE — PROGRESS NOTES
2nd attempt-C3 nurse attempted to contact Radha Day for a TCC post hospital discharge follow up call. No answer. LVM requesting a callback at [phone].    The patient does not have a scheduled HOSFU appointment. Message previously sent to GI's staff to assist with HOSFU appointment scheduling, as the patient has a Non-[facility] PCP and unable to route message to their office.

## 2025-04-22 DIAGNOSIS — R22.41 LOCALIZED SWELLING, MASS, OR LUMP OF RIGHT LOWER EXTREMITY: Primary | ICD-10-CM

## 2025-04-28 ENCOUNTER — HOSPITAL ENCOUNTER (OUTPATIENT)
Dept: RADIOLOGY | Facility: HOSPITAL | Age: 70
Discharge: HOME | End: 2025-04-28
Attending: NURSE PRACTITIONER
Payer: MEDICARE

## 2025-04-28 DIAGNOSIS — R22.41 LOCALIZED SWELLING, MASS, OR LUMP OF RIGHT LOWER EXTREMITY: ICD-10-CM

## 2025-04-28 PROCEDURE — 76882 US LMTD JT/FCL EVL NVASC XTR: CPT | Mod: TC,RT

## 2025-08-14 ENCOUNTER — ANESTHESIA EVENT (OUTPATIENT)
Dept: ENDOSCOPY | Facility: HOSPITAL | Age: 70
End: 2025-08-14
Payer: MEDICARE

## 2025-08-14 ENCOUNTER — ANESTHESIA (OUTPATIENT)
Dept: ENDOSCOPY | Facility: HOSPITAL | Age: 70
End: 2025-08-14
Payer: MEDICARE

## 2025-08-14 ENCOUNTER — HOSPITAL ENCOUNTER (OUTPATIENT)
Facility: HOSPITAL | Age: 70
Discharge: HOME | End: 2025-08-14
Attending: INTERNAL MEDICINE | Admitting: INTERNAL MEDICINE
Payer: MEDICARE

## 2025-08-14 DIAGNOSIS — K63.5 POLYP OF COLON, UNSPECIFIED PART OF COLON, UNSPECIFIED TYPE: ICD-10-CM

## 2025-08-14 DIAGNOSIS — R19.7 DIARRHEA, UNSPECIFIED TYPE: ICD-10-CM

## 2025-08-14 PROBLEM — K57.90 DIVERTICULOSIS: Status: ACTIVE | Noted: 2025-08-14

## 2025-08-14 LAB — POCT GLUCOSE: 151 MG/DL (ref 70–110)

## 2025-08-14 PROCEDURE — 82962 GLUCOSE BLOOD TEST: CPT | Performed by: INTERNAL MEDICINE

## 2025-08-14 PROCEDURE — 25000003 PHARM REV CODE 250: Performed by: NURSE ANESTHETIST, CERTIFIED REGISTERED

## 2025-08-14 PROCEDURE — 45385 COLONOSCOPY W/LESION REMOVAL: CPT | Mod: PT | Performed by: INTERNAL MEDICINE

## 2025-08-14 PROCEDURE — 37000008 HC ANESTHESIA 1ST 15 MINUTES: Performed by: INTERNAL MEDICINE

## 2025-08-14 PROCEDURE — 27201423 OPTIME MED/SURG SUP & DEVICES STERILE SUPPLY: Performed by: INTERNAL MEDICINE

## 2025-08-14 PROCEDURE — 37000009 HC ANESTHESIA EA ADD 15 MINS: Performed by: INTERNAL MEDICINE

## 2025-08-14 PROCEDURE — 88305 TISSUE EXAM BY PATHOLOGIST: CPT | Performed by: INTERNAL MEDICINE

## 2025-08-14 PROCEDURE — 63600175 PHARM REV CODE 636 W HCPCS: Performed by: NURSE ANESTHETIST, CERTIFIED REGISTERED

## 2025-08-14 RX ORDER — PHENYLEPHRINE HCL IN 0.9% NACL 1 MG/10 ML
SYRINGE (ML) INTRAVENOUS
Status: DISCONTINUED | OUTPATIENT
Start: 2025-08-14 | End: 2025-08-14

## 2025-08-14 RX ORDER — GLUCAGON 1 MG
1 KIT INJECTION
Status: DISCONTINUED | OUTPATIENT
Start: 2025-08-14 | End: 2025-08-14 | Stop reason: HOSPADM

## 2025-08-14 RX ORDER — GLYCOPYRROLATE 0.2 MG/ML
INJECTION INTRAMUSCULAR; INTRAVENOUS
Status: DISCONTINUED
Start: 2025-08-14 | End: 2025-08-14 | Stop reason: HOSPADM

## 2025-08-14 RX ORDER — PROPOFOL 10 MG/ML
INJECTION, EMULSION INTRAVENOUS
Status: DISCONTINUED | OUTPATIENT
Start: 2025-08-14 | End: 2025-08-14

## 2025-08-14 RX ORDER — HYDRALAZINE HYDROCHLORIDE 20 MG/ML
10 INJECTION INTRAMUSCULAR; INTRAVENOUS ONCE
Status: DISCONTINUED | OUTPATIENT
Start: 2025-08-14 | End: 2025-08-14 | Stop reason: HOSPADM

## 2025-08-14 RX ORDER — OXYCODONE AND ACETAMINOPHEN 5; 325 MG/1; MG/1
1 TABLET ORAL
Status: DISCONTINUED | OUTPATIENT
Start: 2025-08-14 | End: 2025-08-14 | Stop reason: HOSPADM

## 2025-08-14 RX ORDER — HYDROMORPHONE HYDROCHLORIDE 2 MG/ML
0.2 INJECTION, SOLUTION INTRAMUSCULAR; INTRAVENOUS; SUBCUTANEOUS EVERY 5 MIN PRN
Status: DISCONTINUED | OUTPATIENT
Start: 2025-08-14 | End: 2025-08-14 | Stop reason: HOSPADM

## 2025-08-14 RX ORDER — PROPOFOL 10 MG/ML
VIAL (ML) INTRAVENOUS
Status: COMPLETED
Start: 2025-08-14 | End: 2025-08-14

## 2025-08-14 RX ORDER — HALOPERIDOL LACTATE 5 MG/ML
0.5 INJECTION, SOLUTION INTRAMUSCULAR EVERY 10 MIN PRN
Status: DISCONTINUED | OUTPATIENT
Start: 2025-08-14 | End: 2025-08-14 | Stop reason: HOSPADM

## 2025-08-14 RX ORDER — GLYCOPYRROLATE 0.2 MG/ML
INJECTION INTRAMUSCULAR; INTRAVENOUS
Status: DISCONTINUED | OUTPATIENT
Start: 2025-08-14 | End: 2025-08-14

## 2025-08-14 RX ADMIN — PROPOFOL 120 MG: 10 INJECTION, EMULSION INTRAVENOUS at 08:08

## 2025-08-14 RX ADMIN — GLYCOPYRROLATE 0.3 MG: 0.2 INJECTION INTRAMUSCULAR; INTRAVENOUS at 08:08

## 2025-08-14 RX ADMIN — Medication 100 MCG: at 08:08

## 2025-08-14 RX ADMIN — PROPOFOL 100 MG: 10 INJECTION, EMULSION INTRAVENOUS at 09:08

## 2025-08-14 RX ADMIN — SODIUM CHLORIDE, SODIUM GLUCONATE, SODIUM ACETATE, POTASSIUM CHLORIDE AND MAGNESIUM CHLORIDE: 526; 502; 368; 37; 30 INJECTION, SOLUTION INTRAVENOUS at 08:08

## 2025-08-14 RX ADMIN — PROPOFOL 100 MG: 10 INJECTION, EMULSION INTRAVENOUS at 08:08

## 2025-08-15 VITALS
WEIGHT: 150 LBS | SYSTOLIC BLOOD PRESSURE: 149 MMHG | HEART RATE: 65 BPM | TEMPERATURE: 97 F | BODY MASS INDEX: 28.32 KG/M2 | OXYGEN SATURATION: 98 % | RESPIRATION RATE: 17 BRPM | DIASTOLIC BLOOD PRESSURE: 70 MMHG | HEIGHT: 61 IN

## 2025-08-15 LAB — PSYCHE PATHOLOGY RESULT: NORMAL

## 2025-09-05 ENCOUNTER — ANESTHESIA (OUTPATIENT)
Dept: ENDOSCOPY | Facility: HOSPITAL | Age: 70
End: 2025-09-05
Payer: MEDICARE

## 2025-09-05 ENCOUNTER — ANESTHESIA EVENT (OUTPATIENT)
Dept: ENDOSCOPY | Facility: HOSPITAL | Age: 70
End: 2025-09-05
Payer: MEDICARE

## 2025-09-05 PROBLEM — K92.2 LOWER GI BLEED: Status: ACTIVE | Noted: 2025-09-05

## 2025-09-05 PROCEDURE — 63600175 PHARM REV CODE 636 W HCPCS: Performed by: NURSE ANESTHETIST, CERTIFIED REGISTERED

## 2025-09-05 PROCEDURE — 25000003 PHARM REV CODE 250: Performed by: NURSE ANESTHETIST, CERTIFIED REGISTERED

## 2025-09-05 RX ORDER — PHENYLEPHRINE HCL IN 0.9% NACL 1 MG/10 ML
SYRINGE (ML) INTRAVENOUS
Status: DISCONTINUED | OUTPATIENT
Start: 2025-09-05 | End: 2025-09-05

## 2025-09-05 RX ORDER — PROPOFOL 10 MG/ML
INJECTION, EMULSION INTRAVENOUS
Status: DISCONTINUED | OUTPATIENT
Start: 2025-09-05 | End: 2025-09-05

## 2025-09-05 RX ORDER — LIDOCAINE HYDROCHLORIDE 20 MG/ML
INJECTION INTRAVENOUS
Status: DISCONTINUED | OUTPATIENT
Start: 2025-09-05 | End: 2025-09-05

## 2025-09-05 RX ADMIN — PROPOFOL 20 MG: 10 INJECTION, EMULSION INTRAVENOUS at 12:09

## 2025-09-05 RX ADMIN — LIDOCAINE HYDROCHLORIDE 80 MG: 20 INJECTION INTRAVENOUS at 12:09

## 2025-09-05 RX ADMIN — PROPOFOL 80 MG: 10 INJECTION, EMULSION INTRAVENOUS at 12:09

## 2025-09-05 RX ADMIN — Medication 50 MCG: at 12:09

## 2025-09-05 RX ADMIN — PROPOFOL 30 MG: 10 INJECTION, EMULSION INTRAVENOUS at 12:09

## 2025-09-05 RX ADMIN — SODIUM CHLORIDE, SODIUM GLUCONATE, SODIUM ACETATE, POTASSIUM CHLORIDE AND MAGNESIUM CHLORIDE: 526; 502; 368; 37; 30 INJECTION, SOLUTION INTRAVENOUS at 11:09

## 2025-09-05 RX ADMIN — PROPOFOL 10 MG: 10 INJECTION, EMULSION INTRAVENOUS at 12:09

## (undated) DEVICE — SYRINGE 0.9% NACL 10MIL PREFIL

## (undated) DEVICE — FORCEP BX LG CAP 2.8MMX240CM

## (undated) DEVICE — SNARE SNAREMASTER PLUS 10MM

## (undated) DEVICE — KIT SURGICAL COLON .25 1.1OZ

## (undated) DEVICE — BAG LABGUARD BIOHAZARD 6X9IN

## (undated) DEVICE — CABLE EKG DL RBBN 3 LEAD DISP

## (undated) DEVICE — BLOCK BLOX BITE DENT RIM 54FR

## (undated) DEVICE — TIP SUCTION YANKAUER

## (undated) DEVICE — MANIFOLD 4 PORT

## (undated) DEVICE — LINER MEDI-VAC PPV FLTR 1500CC

## (undated) DEVICE — FORCEP ALLIGATOR 2.8MM W/NDL

## (undated) DEVICE — SOL IRRI STRL WATER 1000ML

## (undated) DEVICE — CONTAINER SPECIMEN SCREW 4OZ

## (undated) DEVICE — TRAP ETRAP POLYP 50 TRAY

## (undated) DEVICE — CONTAINER SPEC STRL PATH 4OZ